# Patient Record
Sex: FEMALE | Race: WHITE | NOT HISPANIC OR LATINO | Employment: FULL TIME | URBAN - METROPOLITAN AREA
[De-identification: names, ages, dates, MRNs, and addresses within clinical notes are randomized per-mention and may not be internally consistent; named-entity substitution may affect disease eponyms.]

---

## 2020-02-09 ENCOUNTER — HOSPITAL ENCOUNTER (INPATIENT)
Facility: HOSPITAL | Age: 20
LOS: 3 days | Discharge: HOME | DRG: 871 | End: 2020-02-12
Attending: EMERGENCY MEDICINE | Admitting: INTERNAL MEDICINE
Payer: MEDICAID

## 2020-02-09 DIAGNOSIS — A41.9 SEPSIS: Primary | ICD-10-CM

## 2020-02-09 DIAGNOSIS — D50.0 IRON DEFICIENCY ANEMIA DUE TO CHRONIC BLOOD LOSS: ICD-10-CM

## 2020-02-09 DIAGNOSIS — R50.9 FEVER OF UNKNOWN ORIGIN (FUO): ICD-10-CM

## 2020-02-09 PROBLEM — B34.9 ACUTE VIRAL SYNDROME: Status: ACTIVE | Noted: 2020-02-09

## 2020-02-09 PROBLEM — E87.6 HYPOKALEMIA: Status: ACTIVE | Noted: 2020-02-09

## 2020-02-09 PROBLEM — I95.9 HYPOTENSION: Status: ACTIVE | Noted: 2020-02-09

## 2020-02-09 LAB
ALBUMIN SERPL BCP-MCNC: 3.8 G/DL (ref 3.5–5.2)
ALP SERPL-CCNC: 52 U/L (ref 55–135)
ALT SERPL W/O P-5'-P-CCNC: 13 U/L (ref 10–44)
ANION GAP SERPL CALC-SCNC: 11 MMOL/L (ref 8–16)
AST SERPL-CCNC: 18 U/L (ref 10–40)
BASOPHILS # BLD AUTO: 0.02 K/UL (ref 0–0.2)
BASOPHILS NFR BLD: 0.2 % (ref 0–1.9)
BILIRUB SERPL-MCNC: 0.6 MG/DL (ref 0.1–1)
BILIRUB UR QL STRIP: NEGATIVE
BUN SERPL-MCNC: 13 MG/DL (ref 6–20)
CALCIUM SERPL-MCNC: 9 MG/DL (ref 8.7–10.5)
CHLORIDE SERPL-SCNC: 108 MMOL/L (ref 95–110)
CLARITY UR: CLEAR
CO2 SERPL-SCNC: 22 MMOL/L (ref 23–29)
COLOR UR: YELLOW
CREAT SERPL-MCNC: 0.8 MG/DL (ref 0.5–1.4)
CRP SERPL-MCNC: 133.2 MG/L (ref 0–8.2)
DIFFERENTIAL METHOD: ABNORMAL
EOSINOPHIL # BLD AUTO: 0 K/UL (ref 0–0.5)
EOSINOPHIL NFR BLD: 0.3 % (ref 0–8)
ERYTHROCYTE [DISTWIDTH] IN BLOOD BY AUTOMATED COUNT: 15.7 % (ref 11.5–14.5)
EST. GFR  (AFRICAN AMERICAN): >60 ML/MIN/1.73 M^2
EST. GFR  (NON AFRICAN AMERICAN): >60 ML/MIN/1.73 M^2
FERRITIN SERPL-MCNC: 10 NG/ML (ref 20–300)
GLUCOSE SERPL-MCNC: 116 MG/DL (ref 70–110)
GLUCOSE UR QL STRIP: NEGATIVE
HCT VFR BLD AUTO: 32.7 % (ref 37–48.5)
HGB BLD-MCNC: 9.1 G/DL (ref 12–16)
HGB UR QL STRIP: ABNORMAL
HIV 1+2 AB+HIV1 P24 AG SERPL QL IA: NEGATIVE
IMM GRANULOCYTES # BLD AUTO: 0.04 K/UL (ref 0–0.04)
IMM GRANULOCYTES NFR BLD AUTO: 0.4 % (ref 0–0.5)
INFLUENZA A, MOLECULAR: NEGATIVE
INFLUENZA B, MOLECULAR: NEGATIVE
IRON SERPL-MCNC: 12 UG/DL (ref 30–160)
KETONES UR QL STRIP: NEGATIVE
LACTATE SERPL-SCNC: 1.4 MMOL/L (ref 0.5–2.2)
LEUKOCYTE ESTERASE UR QL STRIP: NEGATIVE
LYMPHOCYTES # BLD AUTO: 0.3 K/UL (ref 1–4.8)
LYMPHOCYTES NFR BLD: 2.4 % (ref 18–48)
MCH RBC QN AUTO: 20.8 PG (ref 27–31)
MCHC RBC AUTO-ENTMCNC: 27.8 G/DL (ref 32–36)
MCV RBC AUTO: 75 FL (ref 82–98)
MONOCYTES # BLD AUTO: 0.6 K/UL (ref 0.3–1)
MONOCYTES NFR BLD: 5.8 % (ref 4–15)
NEUTROPHILS # BLD AUTO: 9.4 K/UL (ref 1.8–7.7)
NEUTROPHILS NFR BLD: 90.9 % (ref 38–73)
NITRITE UR QL STRIP: NEGATIVE
NRBC BLD-RTO: 0 /100 WBC
PH UR STRIP: 6 [PH] (ref 5–8)
PLATELET # BLD AUTO: 302 K/UL (ref 150–350)
PMV BLD AUTO: 8.9 FL (ref 9.2–12.9)
POTASSIUM SERPL-SCNC: 3.4 MMOL/L (ref 3.5–5.1)
PROT SERPL-MCNC: 7.2 G/DL (ref 6–8.4)
PROT UR QL STRIP: NEGATIVE
RBC # BLD AUTO: 4.38 M/UL (ref 4–5.4)
SATURATED IRON: 3 % (ref 20–50)
SODIUM SERPL-SCNC: 141 MMOL/L (ref 136–145)
SP GR UR STRIP: 1.02 (ref 1–1.03)
SPECIMEN SOURCE: NORMAL
TOTAL IRON BINDING CAPACITY: 417 UG/DL (ref 250–450)
TRANSFERRIN SERPL-MCNC: 282 MG/DL (ref 200–375)
URN SPEC COLLECT METH UR: ABNORMAL
UROBILINOGEN UR STRIP-ACNC: NEGATIVE EU/DL
WBC # BLD AUTO: 10.3 K/UL (ref 3.9–12.7)

## 2020-02-09 PROCEDURE — 96375 TX/PRO/DX INJ NEW DRUG ADDON: CPT

## 2020-02-09 PROCEDURE — 63600175 PHARM REV CODE 636 W HCPCS: Performed by: NURSE PRACTITIONER

## 2020-02-09 PROCEDURE — 96366 THER/PROPH/DIAG IV INF ADDON: CPT

## 2020-02-09 PROCEDURE — 81003 URINALYSIS AUTO W/O SCOPE: CPT

## 2020-02-09 PROCEDURE — 63600175 PHARM REV CODE 636 W HCPCS: Performed by: FAMILY MEDICINE

## 2020-02-09 PROCEDURE — 63600175 PHARM REV CODE 636 W HCPCS: Performed by: INTERNAL MEDICINE

## 2020-02-09 PROCEDURE — 85025 COMPLETE CBC W/AUTO DIFF WBC: CPT

## 2020-02-09 PROCEDURE — G0378 HOSPITAL OBSERVATION PER HR: HCPCS

## 2020-02-09 PROCEDURE — 86140 C-REACTIVE PROTEIN: CPT

## 2020-02-09 PROCEDURE — 86703 HIV-1/HIV-2 1 RESULT ANTBDY: CPT

## 2020-02-09 PROCEDURE — 87040 BLOOD CULTURE FOR BACTERIA: CPT | Mod: 59

## 2020-02-09 PROCEDURE — 93005 ELECTROCARDIOGRAM TRACING: CPT

## 2020-02-09 PROCEDURE — 96365 THER/PROPH/DIAG IV INF INIT: CPT

## 2020-02-09 PROCEDURE — 80053 COMPREHEN METABOLIC PANEL: CPT

## 2020-02-09 PROCEDURE — 99291 CRITICAL CARE FIRST HOUR: CPT | Mod: 25

## 2020-02-09 PROCEDURE — 96367 TX/PROPH/DG ADDL SEQ IV INF: CPT

## 2020-02-09 PROCEDURE — 83540 ASSAY OF IRON: CPT

## 2020-02-09 PROCEDURE — 36415 COLL VENOUS BLD VENIPUNCTURE: CPT

## 2020-02-09 PROCEDURE — 87502 INFLUENZA DNA AMP PROBE: CPT

## 2020-02-09 PROCEDURE — 63600175 PHARM REV CODE 636 W HCPCS: Performed by: EMERGENCY MEDICINE

## 2020-02-09 PROCEDURE — 96361 HYDRATE IV INFUSION ADD-ON: CPT

## 2020-02-09 PROCEDURE — 96376 TX/PRO/DX INJ SAME DRUG ADON: CPT

## 2020-02-09 PROCEDURE — 25000003 PHARM REV CODE 250: Performed by: EMERGENCY MEDICINE

## 2020-02-09 PROCEDURE — 83605 ASSAY OF LACTIC ACID: CPT

## 2020-02-09 PROCEDURE — 21400001 HC TELEMETRY ROOM

## 2020-02-09 PROCEDURE — 82728 ASSAY OF FERRITIN: CPT

## 2020-02-09 PROCEDURE — 93010 ELECTROCARDIOGRAM REPORT: CPT | Mod: ,,, | Performed by: INTERNAL MEDICINE

## 2020-02-09 PROCEDURE — 93010 EKG 12-LEAD: ICD-10-PCS | Mod: ,,, | Performed by: INTERNAL MEDICINE

## 2020-02-09 RX ORDER — IPRATROPIUM BROMIDE AND ALBUTEROL SULFATE 2.5; .5 MG/3ML; MG/3ML
3 SOLUTION RESPIRATORY (INHALATION) EVERY 4 HOURS PRN
Status: DISCONTINUED | OUTPATIENT
Start: 2020-02-09 | End: 2020-02-12 | Stop reason: HOSPADM

## 2020-02-09 RX ORDER — ACETAMINOPHEN 325 MG/1
650 TABLET ORAL EVERY 6 HOURS PRN
Status: DISCONTINUED | OUTPATIENT
Start: 2020-02-09 | End: 2020-02-12 | Stop reason: HOSPADM

## 2020-02-09 RX ORDER — GUAIFENESIN 100 MG/5ML
200 SOLUTION ORAL EVERY 4 HOURS PRN
Status: DISCONTINUED | OUTPATIENT
Start: 2020-02-09 | End: 2020-02-12 | Stop reason: HOSPADM

## 2020-02-09 RX ORDER — FERROUS SULFATE 325(65) MG
325 TABLET, DELAYED RELEASE (ENTERIC COATED) ORAL 2 TIMES DAILY
Status: DISCONTINUED | OUTPATIENT
Start: 2020-02-09 | End: 2020-02-12 | Stop reason: HOSPADM

## 2020-02-09 RX ORDER — MAG HYDROX/ALUMINUM HYD/SIMETH 200-200-20
30 SUSPENSION, ORAL (FINAL DOSE FORM) ORAL EVERY 6 HOURS PRN
Status: DISCONTINUED | OUTPATIENT
Start: 2020-02-09 | End: 2020-02-12 | Stop reason: HOSPADM

## 2020-02-09 RX ORDER — SODIUM CHLORIDE AND POTASSIUM CHLORIDE 150; 900 MG/100ML; MG/100ML
INJECTION, SOLUTION INTRAVENOUS CONTINUOUS
Status: DISPENSED | OUTPATIENT
Start: 2020-02-09 | End: 2020-02-10

## 2020-02-09 RX ORDER — ACETAMINOPHEN 500 MG
1000 TABLET ORAL
Status: COMPLETED | OUTPATIENT
Start: 2020-02-09 | End: 2020-02-09

## 2020-02-09 RX ORDER — ONDANSETRON 2 MG/ML
4 INJECTION INTRAMUSCULAR; INTRAVENOUS EVERY 8 HOURS PRN
Status: DISCONTINUED | OUTPATIENT
Start: 2020-02-09 | End: 2020-02-12 | Stop reason: HOSPADM

## 2020-02-09 RX ORDER — KETOROLAC TROMETHAMINE 30 MG/ML
15 INJECTION, SOLUTION INTRAMUSCULAR; INTRAVENOUS
Status: COMPLETED | OUTPATIENT
Start: 2020-02-09 | End: 2020-02-09

## 2020-02-09 RX ORDER — VANCOMYCIN HCL IN 5 % DEXTROSE 1G/250ML
20 PLASTIC BAG, INJECTION (ML) INTRAVENOUS
Status: COMPLETED | OUTPATIENT
Start: 2020-02-09 | End: 2020-02-09

## 2020-02-09 RX ORDER — NORELGESTROMIN AND ETHINYL ESTRADIOL 35; 150 UG/MG; UG/MG
1 PATCH TRANSDERMAL WEEKLY
Status: ON HOLD | COMMUNITY
Start: 2019-01-28 | End: 2020-02-12 | Stop reason: SDUPTHER

## 2020-02-09 RX ORDER — DIPHENHYDRAMINE HCL 25 MG
25 CAPSULE ORAL EVERY 6 HOURS PRN
Status: DISCONTINUED | OUTPATIENT
Start: 2020-02-09 | End: 2020-02-12 | Stop reason: HOSPADM

## 2020-02-09 RX ADMIN — PIPERACILLIN AND TAZOBACTAM 4.5 G: 4; .5 INJECTION, POWDER, LYOPHILIZED, FOR SOLUTION INTRAVENOUS; PARENTERAL at 03:02

## 2020-02-09 RX ADMIN — VANCOMYCIN HYDROCHLORIDE 1000 MG: 1 INJECTION, POWDER, LYOPHILIZED, FOR SOLUTION INTRAVENOUS at 05:02

## 2020-02-09 RX ADMIN — SODIUM CHLORIDE, SODIUM LACTATE, POTASSIUM CHLORIDE, AND CALCIUM CHLORIDE 1000 ML: 600; 310; 30; 20 INJECTION, SOLUTION INTRAVENOUS at 04:02

## 2020-02-09 RX ADMIN — VANCOMYCIN HYDROCHLORIDE 500 MG: 500 INJECTION, POWDER, LYOPHILIZED, FOR SOLUTION INTRAVENOUS at 05:02

## 2020-02-09 RX ADMIN — IRON SUCROSE 200 MG: 20 INJECTION, SOLUTION INTRAVENOUS at 05:02

## 2020-02-09 RX ADMIN — ACETAMINOPHEN 1000 MG: 500 TABLET ORAL at 03:02

## 2020-02-09 RX ADMIN — PIPERACILLIN AND TAZOBACTAM 4.5 G: 4; .5 INJECTION, POWDER, LYOPHILIZED, FOR SOLUTION INTRAVENOUS; PARENTERAL at 01:02

## 2020-02-09 RX ADMIN — PIPERACILLIN AND TAZOBACTAM 4.5 G: 4; .5 INJECTION, POWDER, LYOPHILIZED, FOR SOLUTION INTRAVENOUS; PARENTERAL at 08:02

## 2020-02-09 RX ADMIN — SODIUM CHLORIDE AND POTASSIUM CHLORIDE: 9; 1.49 INJECTION, SOLUTION INTRAVENOUS at 06:02

## 2020-02-09 RX ADMIN — CEFTRIAXONE 2 G: 2 INJECTION, SOLUTION INTRAVENOUS at 10:02

## 2020-02-09 RX ADMIN — KETOROLAC TROMETHAMINE 15 MG: 30 INJECTION, SOLUTION INTRAMUSCULAR at 03:02

## 2020-02-09 RX ADMIN — ONDANSETRON 4 MG: 2 INJECTION INTRAMUSCULAR; INTRAVENOUS at 03:02

## 2020-02-09 RX ADMIN — SODIUM CHLORIDE 1413 ML: 0.9 INJECTION, SOLUTION INTRAVENOUS at 03:02

## 2020-02-09 NOTE — H&P
"[facility] Medicine  History & Physical    Patient Name: Danielle Brown  MRN: [id number]  Admission Date: 2/9/2020  Attending Physician: Williams Sagastume MD  Primary Care Provider: Primary Doctor No         Patient information was obtained from patient, past medical records and ER records.     Subjective:     Principal Problem:Sepsis    Chief Complaint:   Chief Complaint   Patient presents with    General Illness     pt c/o dizziness, N/V/D, fever and SOB. States "i felt like this last time i was septic"         HPI: Ms. Brown is a young 19-year-old [place] female, currently a student in nursing, with history of septic episode in October 2018, presented to the ED complaining of fever, nausea, vomiting, diarrhea since early yesterday morning.  She states that her mother who just arrived from [place] yesterday has been having cold, cough symptoms and patient immediately started having similar symptoms.  Patient did not have the flu shot this year, but she tested negative for influenza in the ED.  Fever 102.9, , WBC 70989, 0% bands, lactic acid 1.4.  Urinalysis unremarkable.  Chest x-ray without infiltrates, masses or effusions.  No clear source of infection however patient reports that due to heavy menstrual periods she overuses tampons frequently.  But denies lower abdominal discomfort.  She just removed the tampon in the ED after 12 hr.  Patient has not made an appointment to see an OBGYN yet for heavy menstrual periods.  Per ED physician, patient looked toxic upon initial presentation to the ED, but after 2 L of IV fluids she is less toxic appearing. There was a concern of tampon induced staphylococcal toxic shock syndrome and hence she was started on IV vancomycin, along with IV Zosyn.    Admit diagnosis sepsis likely secondary to acute viral syndrome versus others.    Past Medical History:   Diagnosis Date    Sepsis        History reviewed. No pertinent surgical history.    Review " of patient's allergies indicates:   Allergen Reactions    Sulfamethoxazole-trimethoprim Swelling     To lips  Other reaction(s): lip swelling      Sulfa (sulfonamide antibiotics)        No current facility-administered medications on file prior to encounter.      Current Outpatient Medications on File Prior to Encounter   Medication Sig    norelgestromin-ethinyl estradiol (ORTHO EVRA) 150-35 mcg/24 hr Place 1 patch onto the skin once a week.    [DISCONTINUED] naproxen (NAPROSYN) 375 MG tablet Take 1 tablet (375 mg total) by mouth 2 (two) times daily with meals.     Family History     Problem Relation (Age of Onset)    Spinal muscular atrophy Brother        Tobacco Use    Smoking status: Never Smoker    Smokeless tobacco: Never Used   Substance and Sexual Activity    Alcohol use: Never     Frequency: Never    Drug use: Never    Sexual activity: Not Currently     Review of Systems   Constitutional: Positive for chills, diaphoresis, fatigue and fever.   HENT: Negative.  Negative for congestion, nosebleeds and sinus pressure.    Eyes: Negative.  Negative for visual disturbance.   Respiratory: Negative.  Negative for cough, chest tightness, shortness of breath and wheezing.    Cardiovascular: Negative.  Negative for chest pain, palpitations and leg swelling.   Gastrointestinal: Positive for diarrhea (2-3 episodes of watery, nonbloody), nausea and vomiting. Negative for abdominal pain.   Endocrine: Negative.  Negative for polyuria.   Genitourinary: Negative.  Negative for dysuria, flank pain, frequency and urgency.   Musculoskeletal: Negative.  Negative for back pain, joint swelling and neck stiffness.   Skin: Negative.  Negative for color change, pallor and rash.   Allergic/Immunologic: Negative.  Negative for immunocompromised state.   Neurological: Positive for dizziness. Negative for syncope, speech difficulty, numbness and headaches.   Hematological: Negative.  Negative for adenopathy. Does not bruise/bleed  easily.   Psychiatric/Behavioral: Negative.  Negative for confusion, decreased concentration and hallucinations. The patient is not nervous/anxious.    All other systems reviewed and are negative.    Objective:     Vital Signs (Most Recent):  Temp: 100.3 °F (37.9 °C) (02/09/20 0521)  Pulse: 104 (02/09/20 0521)  Resp: 19 (02/09/20 0521)  BP: (!) 92/55 (02/09/20 0521)  SpO2: 100 % (02/09/20 0521) Vital Signs (24h Range):  Temp:  [100.3 °F (37.9 °C)-102.9 °F (39.4 °C)] 100.3 °F (37.9 °C)  Pulse:  [104-130] 104  Resp:  [16-20] 19  SpO2:  [100 %] 100 %  BP: ()/(43-58) 92/55     Weight: 47.1 kg (103 lb 14.4 oz)  Body mass index is 19 kg/m².    Physical Exam   Constitutional: She is oriented to person, place, and time. No distress.   Appears uncomfortable, sickly.  But in no respiratory distress. No family at the bedside.   HENT:   Head: Normocephalic and atraumatic.   Eyes: Conjunctivae and EOM are normal. No scleral icterus.   Neck: Normal range of motion. Neck supple. No tracheal deviation present. No thyromegaly present.   Cardiovascular: Regular rhythm, normal heart sounds and intact distal pulses. Tachycardia present.   No murmur heard.  Pulmonary/Chest: Effort normal and breath sounds normal. No respiratory distress. She has no wheezes. She has no rales. She exhibits no tenderness.   Abdominal: Soft. Bowel sounds are normal. She exhibits no distension. There is no tenderness.   Musculoskeletal: Normal range of motion. She exhibits no edema or tenderness.   Neurological: She is alert and oriented to person, place, and time. No cranial nerve deficit. She exhibits normal muscle tone. Coordination normal.   Skin: Skin is warm and dry. She is not diaphoretic. No erythema.   Psychiatric: She has a normal mood and affect. Her behavior is normal. Judgment and thought content normal.   Nursing note and vitals reviewed.        CRANIAL NERVES     CN III, IV, VI   Extraocular motions are normal.        Significant Labs:    Bilirubin:   Recent Labs   Lab 02/09/20  0259   BILITOT 0.6     Blood Culture: No results for input(s): LABBLOO in the last 48 hours.  BMP:   Recent Labs   Lab 02/09/20  0259   *      K 3.4*      CO2 22*   BUN 13   CREATININE 0.8   CALCIUM 9.0     CBC:   Recent Labs   Lab 02/09/20  0259   WBC 10.30   HGB 9.1*   HCT 32.7*        CMP:   Recent Labs   Lab 02/09/20  0259      K 3.4*      CO2 22*   *   BUN 13   CREATININE 0.8   CALCIUM 9.0   PROT 7.2   ALBUMIN 3.8   BILITOT 0.6   ALKPHOS 52*   AST 18   ALT 13   ANIONGAP 11   EGFRNONAA >60     Cardiac Markers: No results for input(s): CKMB, MYOGLOBIN, BNP, TROPISTAT in the last 48 hours.  Coagulation: No results for input(s): PT, INR, APTT in the last 48 hours.  Lactic Acid:   Recent Labs   Lab 02/09/20  0259   LACTATE 1.4     Troponin: No results for input(s): TROPONINI in the last 48 hours.  Urine Studies:   Recent Labs   Lab 02/09/20  0412   COLORU Yellow   APPEARANCEUA Clear   PHUR 6.0   SPECGRAV 1.020   PROTEINUA Negative   GLUCUA Negative   KETONESU Negative   BILIRUBINUA Negative   OCCULTUA Trace*   NITRITE Negative   UROBILINOGEN Negative   LEUKOCYTESUR Negative     All pertinent labs within the past 24 hours have been reviewed.    Significant Imaging: I have reviewed and interpreted all pertinent imaging results/findings within the past 24 hours.     Imaging Results          X-Ray Chest AP Portable (In process)                 I have independently reviewed all pertinent labs within the past 24 hours.    I have independently reviewed, visualized and interpreted all pertinent imaging results within the past 24 hours and discussed the findings with the ED physician, Dr. Berrios            Assessment/Plan:     * Sepsis  Fever 102.9, , WBC 70040, 0% bands, lactic acid 1.4.  Unclear etiology of patient's infection, but strongly suspect acute viral syndrome.  ED physician was concerned about tampon induced  staphylococcal toxic shock syndrome, though unlikely/rare.  On IV vancomycin, IV Zosyn empirically.  Deescalate antibiotics as soon as feasible.  Follow up on cultures.  Continue normal saline 125 cc with potassium supplementation.  She received 2 L normal saline boluses in the ED.  Patient concerned about recurrent sepsis requiring hospitalization in the past.  Requesting for ID consult/evaluation (??  Immunodeficiency syndrome at baseline??  HIV negative.        Hypotension  Latest blood pressure 92/55, in the setting of sepsis.  Lactic acid 1.4  Young female, continue aggressive IV fluid hydration.  Unclear etiology of patient's infection.      Acute viral syndrome  Negative for influenza and HIV  But patient's mother had cough, congestion (URI type symptoms)  Likely etiology for patient sepsis.  Continue supportive care  Bronchodilators, IV fluids, antitussives      Hypokalemia  Potassium 3.2.  Labs in a.m4.  Replace with NS with 20 mEq KCL at 125 cc/hour.  Repeat        VTE Risk Mitigation (From admission, onward)         Ordered     Place sequential compression device  Until discontinued      02/09/20 2170                   Williams Sagastume MD  Department of Hospital Medicine   [facility]

## 2020-02-09 NOTE — SUBJECTIVE & OBJECTIVE
Past Medical History:   Diagnosis Date    Sepsis        History reviewed. No pertinent surgical history.    Review of patient's allergies indicates:   Allergen Reactions    Sulfamethoxazole-trimethoprim Swelling     To lips  Other reaction(s): lip swelling      Sulfa (sulfonamide antibiotics)        No current facility-administered medications on file prior to encounter.      Current Outpatient Medications on File Prior to Encounter   Medication Sig    norelgestromin-ethinyl estradiol (ORTHO EVRA) 150-35 mcg/24 hr Place 1 patch onto the skin once a week.    [DISCONTINUED] naproxen (NAPROSYN) 375 MG tablet Take 1 tablet (375 mg total) by mouth 2 (two) times daily with meals.     Family History     Problem Relation (Age of Onset)    Spinal muscular atrophy Brother        Tobacco Use    Smoking status: Never Smoker    Smokeless tobacco: Never Used   Substance and Sexual Activity    Alcohol use: Never     Frequency: Never    Drug use: Never    Sexual activity: Not Currently     Review of Systems   Constitutional: Positive for chills, diaphoresis, fatigue and fever.   HENT: Negative.  Negative for congestion, nosebleeds and sinus pressure.    Eyes: Negative.  Negative for visual disturbance.   Respiratory: Negative.  Negative for cough, chest tightness, shortness of breath and wheezing.    Cardiovascular: Negative.  Negative for chest pain, palpitations and leg swelling.   Gastrointestinal: Positive for diarrhea (2-3 episodes of watery, nonbloody), nausea and vomiting. Negative for abdominal pain.   Endocrine: Negative.  Negative for polyuria.   Genitourinary: Negative.  Negative for dysuria, flank pain, frequency and urgency.   Musculoskeletal: Negative.  Negative for back pain, joint swelling and neck stiffness.   Skin: Negative.  Negative for color change, pallor and rash.   Allergic/Immunologic: Negative.  Negative for immunocompromised state.   Neurological: Positive for dizziness. Negative for syncope,  speech difficulty, numbness and headaches.   Hematological: Negative.  Negative for adenopathy. Does not bruise/bleed easily.   Psychiatric/Behavioral: Negative.  Negative for confusion, decreased concentration and hallucinations. The patient is not nervous/anxious.    All other systems reviewed and are negative.    Objective:     Vital Signs (Most Recent):  Temp: 100.3 °F (37.9 °C) (02/09/20 0521)  Pulse: 104 (02/09/20 0521)  Resp: 19 (02/09/20 0521)  BP: (!) 92/55 (02/09/20 0521)  SpO2: 100 % (02/09/20 0521) Vital Signs (24h Range):  Temp:  [100.3 °F (37.9 °C)-102.9 °F (39.4 °C)] 100.3 °F (37.9 °C)  Pulse:  [104-130] 104  Resp:  [16-20] 19  SpO2:  [100 %] 100 %  BP: ()/(43-58) 92/55     Weight: 47.1 kg (103 lb 14.4 oz)  Body mass index is 19 kg/m².    Physical Exam   Constitutional: She is oriented to person, place, and time. No distress.   Appears uncomfortable, sickly.  But in no respiratory distress. No family at the bedside.   HENT:   Head: Normocephalic and atraumatic.   Eyes: Conjunctivae and EOM are normal. No scleral icterus.   Neck: Normal range of motion. Neck supple. No tracheal deviation present. No thyromegaly present.   Cardiovascular: Regular rhythm, normal heart sounds and intact distal pulses. Tachycardia present.   No murmur heard.  Pulmonary/Chest: Effort normal and breath sounds normal. No respiratory distress. She has no wheezes. She has no rales. She exhibits no tenderness.   Abdominal: Soft. Bowel sounds are normal. She exhibits no distension. There is no tenderness.   Musculoskeletal: Normal range of motion. She exhibits no edema or tenderness.   Neurological: She is alert and oriented to person, place, and time. No cranial nerve deficit. She exhibits normal muscle tone. Coordination normal.   Skin: Skin is warm and dry. She is not diaphoretic. No erythema.   Psychiatric: She has a normal mood and affect. Her behavior is normal. Judgment and thought content normal.   Nursing note and  vitals reviewed.        CRANIAL NERVES     CN III, IV, VI   Extraocular motions are normal.        Significant Labs:   Bilirubin:   Recent Labs   Lab 02/09/20  0259   BILITOT 0.6     Blood Culture: No results for input(s): LABBLOO in the last 48 hours.  BMP:   Recent Labs   Lab 02/09/20 0259   *      K 3.4*      CO2 22*   BUN 13   CREATININE 0.8   CALCIUM 9.0     CBC:   Recent Labs   Lab 02/09/20 0259   WBC 10.30   HGB 9.1*   HCT 32.7*        CMP:   Recent Labs   Lab 02/09/20 0259      K 3.4*      CO2 22*   *   BUN 13   CREATININE 0.8   CALCIUM 9.0   PROT 7.2   ALBUMIN 3.8   BILITOT 0.6   ALKPHOS 52*   AST 18   ALT 13   ANIONGAP 11   EGFRNONAA >60     Cardiac Markers: No results for input(s): CKMB, MYOGLOBIN, BNP, TROPISTAT in the last 48 hours.  Coagulation: No results for input(s): PT, INR, APTT in the last 48 hours.  Lactic Acid:   Recent Labs   Lab 02/09/20 0259   LACTATE 1.4     Troponin: No results for input(s): TROPONINI in the last 48 hours.  Urine Studies:   Recent Labs   Lab 02/09/20  0412   COLORU Yellow   APPEARANCEUA Clear   PHUR 6.0   SPECGRAV 1.020   PROTEINUA Negative   GLUCUA Negative   KETONESU Negative   BILIRUBINUA Negative   OCCULTUA Trace*   NITRITE Negative   UROBILINOGEN Negative   LEUKOCYTESUR Negative     All pertinent labs within the past 24 hours have been reviewed.    Significant Imaging: I have reviewed and interpreted all pertinent imaging results/findings within the past 24 hours.     Imaging Results          X-Ray Chest AP Portable (In process)                 I have independently reviewed all pertinent labs within the past 24 hours.    I have independently reviewed, visualized and interpreted all pertinent imaging results within the past 24 hours and discussed the findings with the ED physician, Dr. Berrios

## 2020-02-09 NOTE — PROGRESS NOTES
Pharmacokinetic Initial Assessment: IV Vancomycin    Assessment/Plan:    Initiate intravenous vancomycin with loading dose of 1000 mg once followed by a maintenance dose of vancomycin 500mg IV every 12 hours  Desired empiric serum trough concentration is 10 to 20 mcg/mL  Draw vancomycin trough level 30 min prior to fourth dose on 2/10 at approximately 1730  Pharmacy will continue to follow and monitor vancomycin.      Please contact pharmacy at extension [phone] with any questions regarding this assessment.     Thank you for the consult,   Stacia March       Patient brief summary:  Danielle Brown is a 19 y.o. female initiated on antimicrobial therapy with IV Vancomycin for treatment of suspected sepsis    Drug Allergies:   Review of patient's allergies indicates:   Allergen Reactions    Sulfamethoxazole-trimethoprim Swelling     To lips  Other reaction(s): lip swelling      Sulfa (sulfonamide antibiotics)        Actual Body Weight:   47.1kg    Renal Function:   Estimated Creatinine Clearance: 84.1 mL/min (based on SCr of 0.8 mg/dL).,     Dialysis Method (if applicable):  N/A    CBC (last 72 hours):  Recent Labs   Lab Result Units 02/09/20  0259   WBC K/uL 10.30   Hemoglobin g/dL 9.1*   Hematocrit % 32.7*   Platelets K/uL 302   Gran% % 90.9*   Lymph% % 2.4*   Mono% % 5.8   Eosinophil% % 0.3   Basophil% % 0.2   Differential Method  Automated       Metabolic Panel (last 72 hours):  Recent Labs   Lab Result Units 02/09/20  0259 02/09/20  0412   Sodium mmol/L 141  --    Potassium mmol/L 3.4*  --    Chloride mmol/L 108  --    CO2 mmol/L 22*  --    Glucose mg/dL 116*  --    Glucose, UA   --  Negative   BUN, Bld mg/dL 13  --    Creatinine mg/dL 0.8  --    Albumin g/dL 3.8  --    Total Bilirubin mg/dL 0.6  --    Alkaline Phosphatase U/L 52*  --    AST U/L 18  --    ALT U/L 13  --        Drug levels (last 3 results):  No results for input(s): VANCOMYCINRA, VANCOMYCINPE, VANCOMYCINTR in the last 72 hours.    Microbiologic  Results:  Microbiology Results (last 7 days)     Procedure Component Value Units Date/Time    Blood culture x two cultures. Draw prior to antibiotics. [[id number]] Collected:  02/09/20 0316    Order Status:  Sent Specimen:  Blood from Peripheral, Antecubital, Right Updated:  02/09/20 0552    Influenza A & B by Molecular [[id number]] Collected:  02/09/20 0316    Order Status:  Completed Specimen:  Nasopharyngeal Swab Updated:  02/09/20 0357     Influenza A, Molecular Negative     Influenza B, Molecular Negative     Flu A & B Source Nasal swab    Blood culture x two cultures. Draw prior to antibiotics. [[id number]] Collected:  02/09/20 0255    Order Status:  Sent Specimen:  Blood from Peripheral, Antecubital, Right Updated:  02/09/20 0304

## 2020-02-09 NOTE — ASSESSMENT & PLAN NOTE
Fever 102.9, , WBC 11269, 0% bands, lactic acid 1.4.  Unclear etiology of patient's infection, but strongly suspect acute viral syndrome.  ED physician was concerned about tampon induced staphylococcal toxic shock syndrome, though unlikely/rare.  On IV vancomycin, IV Zosyn empirically.  Deescalate antibiotics as soon as feasible.  Follow up on cultures.  Continue normal saline 125 cc with potassium supplementation.  She received 2 L normal saline boluses in the ED.  Patient concerned about recurrent sepsis requiring hospitalization in the past.  Requesting for ID consult/evaluation (??  Immunodeficiency syndrome at baseline??  HIV negative.

## 2020-02-09 NOTE — ED NOTES
Dr. Fink, [facility] medicine, at bedside for pt evaluation.  Dr. Fink notified of pt's report of irregular vaginal bleeding and prolonged tampon use.  No new orders at this time.

## 2020-02-09 NOTE — ED PROVIDER NOTES
"  History of Present Illness: Danielle Brown is a 19 y.o. female patient presenting for fever, dizziness, n/v/d    2:08 AM: Pt was placed back in [facility]. I explained to pt that due to lack of available rooms pt was seen by myself to begin the workup. Pt understands they will be seen and dispositioned by the next available physician. Pt voices understanding and agrees with plan. Pt also understands the longer than normal wait time. I am removing myself from the care of pt and pt will now be assigned to the next available physician.       SCRIBE #1 NOTE: I, Wilbert Florez, am scribing for, and in the presence of, Pedro Berrios MD. I have scribed the entire note.       History     Chief Complaint   Patient presents with    General Illness     pt c/o dizziness, N/V/D, fever and SOB. States "i felt like this last time i was septic"      Review of patient's allergies indicates:   Allergen Reactions    Sulfamethoxazole-trimethoprim Swelling     To lips  Other reaction(s): lip swelling      Sulfa (sulfonamide antibiotics)          History of Present Illness     HPI    2/9/2020, 3:16 AM  History obtained from the patient      History of Present Illness: Danielle Brown is a 19 y.o. female patient with a PMHx of sepsis who presents to the Emergency Department for evaluation of general illness which onset gradually this evening. Pt reports picking up her mother from the airport, who was flying in from [place] and was sick. Pt states "I felt like this last time I was septic" and remarked that about a year ago she was admitted and treated for sepsis. Pt states she hasn't yet f/u with an infectious disease specialist due to being very busy. Pt uses tampons and states that she's had several occasions of having one in for more than 8-10 hours, and has been using more than usual due to bleeding every 2 weeks or so. Symptoms are constant and moderate in severity. No mitigating or exacerbating factors reported. Associated sxs " include dizziness, N/V/D, fever, chills, diaphoresis, generalized myalgias, HA, lower back pain, and SOB. Patient denies any cough, hematochezia, abdominal pain, neck stiffness, syncope, and all other sxs at this time. No prior Tx reported. No further complaints or concerns at this time.       Arrival mode: EMS    PCP: Primary Doctor No        Past Medical History:  Sepsis    Past Surgical History:  History reviewed. No pertinent surgical history.    Family History:  History reviewed. No pertinent family history.    Social History:  Social History Main Topics    Smoking status: Unknown if ever smoked    Smokeless tobacco: Unknown if ever used    Alcohol Use: Unknown drinking history    Drug Use: Unknown if ever used    Sexual Activity: Unknown        Review of Systems     Review of Systems   Constitutional: Positive for chills, diaphoresis and fever.   HENT: Negative for sore throat.    Respiratory: Positive for shortness of breath. Negative for cough.    Cardiovascular: Negative for chest pain and leg swelling.   Gastrointestinal: Positive for diarrhea, nausea and vomiting. Negative for abdominal pain and blood in stool.   Genitourinary: Positive for pelvic pain (Period cramps; every 2 weeks) and vaginal bleeding (Every 2 weeks). Negative for dysuria.   Musculoskeletal: Positive for back pain (Lower) and myalgias (Generalized). Negative for neck pain and neck stiffness.   Skin: Negative for rash and wound.   Neurological: Positive for dizziness and headaches. Negative for syncope, weakness, light-headedness and numbness.   Hematological: Does not bruise/bleed easily.   All other systems reviewed and are negative.     Physical Exam     Initial Vitals   BP Pulse Resp Temp SpO2   02/09/20 0204 02/09/20 0204 02/09/20 0204 02/09/20 0204 02/09/20 0230   (!) 103/53 (!) 130 20 (!) 102.9 °F (39.4 °C) 100 %      MAP       --                 Physical Exam  Nursing Notes and Vital Signs Reviewed.  Constitutional: Patient  is in no acute distress. Diaphoretic and toxic-appearing.  Head: Atraumatic. Normocephalic.  Eyes: PERRL. EOM intact. Conjunctivae are not pale. No scleral icterus.  ENT: Mucous membranes are moist. Oropharynx is clear and symmetric.    Neck: Supple. Full ROM. No lymphadenopathy.  Cardiovascular: Tachycardic. Regular rhythm. No murmurs, rubs, or gallops. Distal pulses are 2+ and symmetric.  Pulmonary/Chest: No respiratory distress. Clear to auscultation bilaterally. No wheezing or rales.  Abdominal: Soft and non-distended.  There is no tenderness.  No rebound, guarding, or rigidity. Good bowel sounds.  Genitourinary: There is right CVA tenderness.  Musculoskeletal: Moves all extremities. No obvious deformities. No edema. No calf tenderness.  Skin: Warm to touch.  Neurological:  Alert, awake, and appropriate.  Normal speech.  No acute focal neurological deficits are appreciated.  Psychiatric: Normal affect. Good eye contact. Appropriate in content.     ED Course   Critical Care  Date/Time: 2/9/2020 3:43 AM  Performed by: Pedro Berrios MD  Authorized by: Pedro Berrios MD   Direct patient critical care time: 15 minutes  Additional history critical care time: 5 minutes  Ordering / reviewing critical care time: 10 minutes  Documentation critical care time: 5 minutes  Consulting other physicians critical care time: 5 minutes  Total critical care time (exclusive of procedural time) : 40 minutes  Critical care time was exclusive of separately billable procedures and treating other patients and teaching time.  Critical care was necessary to treat or prevent imminent or life-threatening deterioration of the following conditions: sepsis.  Critical care was time spent personally by me on the following activities: blood draw for specimens, development of treatment plan with patient or surrogate, discussions with consultants, interpretation of cardiac output measurements, evaluation of patient's response to treatment,  examination of patient, obtaining history from patient or surrogate, ordering and performing treatments and interventions, ordering and review of laboratory studies, ordering and review of radiographic studies, pulse oximetry, re-evaluation of patient's condition and review of old charts.        ED Vital Signs:  Vitals:    02/10/20 0600 02/10/20 0721 02/10/20 0901 02/10/20 1101   BP:  (!) 102/54     Pulse:  103 (!) 111 105   Resp:  17     Temp:  98.9 °F (37.2 °C)     TempSrc:  Oral     SpO2:  99%     Weight: 47.8 kg (105 lb 6.1 oz)      Height:        02/10/20 1113 02/10/20 1230 02/10/20 1245 02/10/20 1300   BP: 102/61 (!) 105/52 (!) 116/59 105/60   Pulse: 97 106 105 101   Resp: 20 20 20 20   Temp: 98.2 °F (36.8 °C) 98.2 °F (36.8 °C) 98.1 °F (36.7 °C) 98.2 °F (36.8 °C)   TempSrc: Oral Oral Oral Oral   SpO2: 100% 99% 100% 100%   Weight:       Height:        02/10/20 1301 02/10/20 1501 02/10/20 1522 02/10/20 1610   BP:   (!) 104/53 (!) 110/56   Pulse: 98 93 100 97   Resp:   20 18   Temp:   98.1 °F (36.7 °C) 98.2 °F (36.8 °C)   TempSrc:   Oral Oral   SpO2:   99% 99%   Weight:       Height:        02/10/20 1625 02/10/20 1701 02/10/20 1924   BP: (!) 107/57  121/81   Pulse: 94 94 81   Resp: 18  18   Temp: 98.1 °F (36.7 °C)     TempSrc: Oral     SpO2: 100%     Weight:      Height:          Abnormal Lab Results:  Labs Reviewed   CBC W/ AUTO DIFFERENTIAL - Abnormal; Notable for the following components:       Result Value    Hemoglobin 9.1 (*)     Hematocrit 32.7 (*)     Mean Corpuscular Volume 75 (*)     Mean Corpuscular Hemoglobin 20.8 (*)     Mean Corpuscular Hemoglobin Conc 27.8 (*)     RDW 15.7 (*)     MPV 8.9 (*)     Gran # (ANC) 9.4 (*)     Lymph # 0.3 (*)     Gran% 90.9 (*)     Lymph% 2.4 (*)     All other components within normal limits   COMPREHENSIVE METABOLIC PANEL - Abnormal; Notable for the following components:    Potassium 3.4 (*)     CO2 22 (*)     Glucose 116 (*)     Alkaline Phosphatase 52 (*)     All  other components within normal limits   URINALYSIS, REFLEX TO URINE CULTURE - Abnormal; Notable for the following components:    Occult Blood UA Trace (*)     All other components within normal limits    Narrative:     Preferred Collection Type->Urine, Clean Catch   INFLUENZA A & B BY MOLECULAR   LACTIC ACID, PLASMA   HIV 1 / 2 ANTIBODY        All Lab Results:  Results for orders placed or performed during the hospital encounter of 02/09/20   Blood culture x two cultures. Draw prior to antibiotics.   Result Value Ref Range    Blood Culture, Routine No Growth to date     Blood Culture, Routine No Growth to date    Blood culture x two cultures. Draw prior to antibiotics.   Result Value Ref Range    Blood Culture, Routine No Growth to date     Blood Culture, Routine No Growth to date    Influenza A & B by Molecular   Result Value Ref Range    Influenza A, Molecular Negative Negative    Influenza B, Molecular Negative Negative    Flu A & B Source Nasal swab    CBC auto differential   Result Value Ref Range    WBC 10.30 3.90 - 12.70 K/uL    RBC 4.38 4.00 - 5.40 M/uL    Hemoglobin 9.1 (L) 12.0 - 16.0 g/dL    Hematocrit 32.7 (L) 37.0 - 48.5 %    Mean Corpuscular Volume 75 (L) 82 - 98 fL    Mean Corpuscular Hemoglobin 20.8 (L) 27.0 - 31.0 pg    Mean Corpuscular Hemoglobin Conc 27.8 (L) 32.0 - 36.0 g/dL    RDW 15.7 (H) 11.5 - 14.5 %    Platelets 302 150 - 350 K/uL    MPV 8.9 (L) 9.2 - 12.9 fL    Immature Granulocytes 0.4 0.0 - 0.5 %    Gran # (ANC) 9.4 (H) 1.8 - 7.7 K/uL    Immature Grans (Abs) 0.04 0.00 - 0.04 K/uL    Lymph # 0.3 (L) 1.0 - 4.8 K/uL    Mono # 0.6 0.3 - 1.0 K/uL    Eos # 0.0 0.0 - 0.5 K/uL    Baso # 0.02 0.00 - 0.20 K/uL    nRBC 0 0 /100 WBC    Gran% 90.9 (H) 38.0 - 73.0 %    Lymph% 2.4 (L) 18.0 - 48.0 %    Mono% 5.8 4.0 - 15.0 %    Eosinophil% 0.3 0.0 - 8.0 %    Basophil% 0.2 0.0 - 1.9 %    Differential Method Automated    Comprehensive metabolic panel   Result Value Ref Range    Sodium 141 136 - 145 mmol/L     Potassium 3.4 (L) 3.5 - 5.1 mmol/L    Chloride 108 95 - 110 mmol/L    CO2 22 (L) 23 - 29 mmol/L    Glucose 116 (H) 70 - 110 mg/dL    BUN, Bld 13 6 - 20 mg/dL    Creatinine 0.8 0.5 - 1.4 mg/dL    Calcium 9.0 8.7 - 10.5 mg/dL    Total Protein 7.2 6.0 - 8.4 g/dL    Albumin 3.8 3.5 - 5.2 g/dL    Total Bilirubin 0.6 0.1 - 1.0 mg/dL    Alkaline Phosphatase 52 (L) 55 - 135 U/L    AST 18 10 - 40 U/L    ALT 13 10 - 44 U/L    Anion Gap 11 8 - 16 mmol/L    eGFR if African American >60 >60 mL/min/1.73 m^2    eGFR if non African American >60 >60 mL/min/1.73 m^2   Lactic acid, plasma #1   Result Value Ref Range    Lactate (Lactic Acid) 1.4 0.5 - 2.2 mmol/L   Urinalysis, Reflex to Urine Culture Urine, Clean Catch   Result Value Ref Range    Specimen UA Urine, Clean Catch     Color, UA Yellow Yellow, Straw, Conchita    Appearance, UA Clear Clear    pH, UA 6.0 5.0 - 8.0    Specific Gravity, UA 1.020 1.005 - 1.030    Protein, UA Negative Negative    Glucose, UA Negative Negative    Ketones, UA Negative Negative    Bilirubin (UA) Negative Negative    Occult Blood UA Trace (A) Negative    Nitrite, UA Negative Negative    Urobilinogen, UA Negative <2.0 EU/dL    Leukocytes, UA Negative Negative   HIV 1/2 Ag/Ab (4th Gen)   Result Value Ref Range    HIV 1/2 Ag/Ab Negative Negative   Iron and TIBC   Result Value Ref Range    Iron 12 (L) 30 - 160 ug/dL    Transferrin 282 200 - 375 mg/dL    TIBC 417 250 - 450 ug/dL    Saturated Iron 3 (L) 20 - 50 %   Ferritin   Result Value Ref Range    Ferritin 10 (L) 20.0 - 300.0 ng/mL   C-reactive protein   Result Value Ref Range    .2 (H) 0.0 - 8.2 mg/L   Magnesium   Result Value Ref Range    Magnesium 1.4 (L) 1.6 - 2.6 mg/dL   Phosphorus   Result Value Ref Range    Phosphorus 2.2 (L) 2.7 - 4.5 mg/dL   CBC auto differential   Result Value Ref Range    WBC 13.48 (H) 3.90 - 12.70 K/uL    RBC 3.30 (L) 4.00 - 5.40 M/uL    Hemoglobin 6.9 (L) 12.0 - 16.0 g/dL    Hematocrit 27.4 (L) 37.0 - 48.5 %    Mean  Corpuscular Volume 83 82 - 98 fL    Mean Corpuscular Hemoglobin 20.9 (L) 27.0 - 31.0 pg    Mean Corpuscular Hemoglobin Conc 25.2 (L) 32.0 - 36.0 g/dL    RDW 16.2 (H) 11.5 - 14.5 %    Platelets 239 150 - 350 K/uL    MPV 8.9 (L) 9.2 - 12.9 fL    Immature Granulocytes 0.6 (H) 0.0 - 0.5 %    Gran # (ANC) 11.8 (H) 1.8 - 7.7 K/uL    Immature Grans (Abs) 0.08 (H) 0.00 - 0.04 K/uL    Lymph # 0.8 (L) 1.0 - 4.8 K/uL    Mono # 0.7 0.3 - 1.0 K/uL    Eos # 0.1 0.0 - 0.5 K/uL    Baso # 0.05 0.00 - 0.20 K/uL    nRBC 0 0 /100 WBC    Gran% 87.6 (H) 38.0 - 73.0 %    Lymph% 5.9 (L) 18.0 - 48.0 %    Mono% 4.8 4.0 - 15.0 %    Eosinophil% 0.7 0.0 - 8.0 %    Basophil% 0.4 0.0 - 1.9 %    Differential Method Automated    Comprehensive metabolic panel   Result Value Ref Range    Sodium 138 136 - 145 mmol/L    Potassium 3.5 3.5 - 5.1 mmol/L    Chloride 113 (H) 95 - 110 mmol/L    CO2 19 (L) 23 - 29 mmol/L    Glucose 103 70 - 110 mg/dL    BUN, Bld 7 6 - 20 mg/dL    Creatinine 0.8 0.5 - 1.4 mg/dL    Calcium 7.7 (L) 8.7 - 10.5 mg/dL    Total Protein 5.1 (L) 6.0 - 8.4 g/dL    Albumin 2.5 (L) 3.5 - 5.2 g/dL    Total Bilirubin 0.5 0.1 - 1.0 mg/dL    Alkaline Phosphatase 33 (L) 55 - 135 U/L    AST 18 10 - 40 U/L    ALT 13 10 - 44 U/L    Anion Gap 6 (L) 8 - 16 mmol/L    eGFR if African American >60 >60 mL/min/1.73 m^2    eGFR if non African American >60 >60 mL/min/1.73 m^2   Immunoglobulins (IgG, IgA, IgM) Quantitative   Result Value Ref Range    IgG - Serum 763 650 - 1600 mg/dL    IgA 161 40 - 350 mg/dL    IgM 83 50 - 300 mg/dL   Procalcitonin   Result Value Ref Range    Procalcitonin 3.01 (H) <0.25 ng/mL   Rheumatoid factor   Result Value Ref Range    Rheumatoid Factor 21.0 (H) 0.0 - 15.0 IU/mL   Sedimentation rate   Result Value Ref Range    Sed Rate 13 0 - 20 mm/Hr   Vancomycin, random   Result Value Ref Range    Vancomycin, Random 3.9 Not established ug/mL   Protime-INR   Result Value Ref Range    Prothrombin Time 13.1 (H) 9.0 - 12.5 sec    INR  1.2 0.8 - 1.2   APTT   Result Value Ref Range    aPTT 31.9 21.0 - 32.0 sec   Pregnancy, urine rapid   Result Value Ref Range    Preg Test, Ur Negative    Hemoglobin   Result Value Ref Range    Hemoglobin 8.7 (L) 12.0 - 16.0 g/dL   Hematocrit   Result Value Ref Range    Hematocrit 30.1 (L) 37.0 - 48.5 %   Echo Color Flow Doppler? Yes   Result Value Ref Range    Echo EF Estimated 64 %    IVS 0.99 0.6 - 1.1 cm    LVIDD 4.33 3.5 - 6.0 cm    LVIDS 2.83 2.1 - 4.0 cm    LVOT diameter 1.89 cm    PW 0.90 0.6 - 1.1 cm    IVC ostium 0.90 cm    IVRT 59.98 ms    HR echo 93 1/minute    AV LVOT peak gradient 4 mmHg    LVOT peak gunner 0.99 m/s    LVOT peak VTI 18.95 cm    RVOT peak gunner 0.64 m/s    RVOT peak VTI 16.41 cm    LA size 3.15 cm    Left Atrium Major Axis 4.75 cm    Left Atrium Minor Axis 4.26 cm    RA Major Axis 3.58 cm    AV mean gradient 8 mmHg    Ao peak gunner 1.96 m/s    Ao VTI 34.68 cm    MV Peak A Gunner 0.63 m/s    E wave decelartion time 196.13 ms    MV Peak E Gunner 1.20 m/s    E/A ratio 1.90     Ascending aorta 2.37 cm    Proximal aorta 2.51 cm    BSA 1.44 m2    FS 35 28 - 44 %    LV mass 133.27 g    Left Ventricle Relative Wall Thickness 0.42 cm    AV valve area 1.53 cm2    AV Velocity Ratio 0.51     AV index (prosthetic) 0.55     LVOT area 2.8 cm2    LVOT stroke volume 53.14 cm3    AV peak gradient 15 mmHg    LV Mass Index 92 g/m2    Right Atrial Pressure (from IVC) 3 mmHg   Type & Screen   Result Value Ref Range    Group & Rh A POS     Indirect Mabel NEG    Prepare RBC 1 Unit   Result Value Ref Range    UNIT NUMBER [id number]     Product Code K4585X69     DISPENSE STATUS ISSUED     CODING SYSTEM [id number]     Unit Blood Type Code 6200     Unit Blood Type A POS     Unit Expiration [id number]        Imaging Results:  Imaging Results          X-Ray Chest AP Portable (Final result)  Result time 02/09/20 07:14:15    Final result by John France MD (02/09/20 07:14:15)                 Impression:      No acute  findings.      Electronically signed by: John France MD  Date:    02/09/2020  Time:    07:14             Narrative:    EXAMINATION:  XR CHEST AP PORTABLE    CLINICAL HISTORY:  Sepsis;    TECHNIQUE:  Single frontal view of the chest was performed.    COMPARISON:  None    FINDINGS:  The cardiomediastinal silhouette is normal.    Lungs are clear.  Extreme lung apices excluded from examination.    Bones are unremarkable.                            4:19 AM: Per ED physician, pt's CXR results: No acute findings.    The EKG was ordered, reviewed, and independently interpreted by the ED provider.  Interpretation time: 0225  Rate: 119 BPM  Rhythm: sinus tachycardia  Interpretation: No acute ST changes. No STEMI.           The Emergency Provider reviewed the vital signs and test results, which are outlined above.     ED Discussion     5:18 AM: Discussed case with Williams Sagastume MD ([facility]). Dr. Sagastume agrees with current care and management of pt and accepts admission.   Admitting Service: [facility]  Admitting Physician: Dr. Sagastume  Admit to: Observation/Med-Tele    5:28 AM: Re-evaluated pt. I have discussed test results, shared treatment plan, and the need for admission with patient at bedside. Pt expresses understanding at this time and agrees with all information. All questions answered. Pt has no further questions or concerns at this time. Pt is ready for admit.       Medical Decision Making:   Clinical Tests:   Lab Tests: Ordered and Reviewed  Radiological Study: Ordered and Reviewed  Medical Tests: Ordered and Reviewed           ED Medication(s):  Medications   acetaminophen tablet 650 mg (has no administration in time range)   ondansetron injection 4 mg (4 mg Intravenous Given 2/9/20 1542)   diphenhydrAMINE capsule 25 mg (has no administration in time range)   aluminum-magnesium hydroxide-simethicone 200-200-20 mg/5 mL suspension 30 mL (has no administration in time range)   guaifenesin 100 mg/5 ml  syrup 200 mg (has no administration in time range)   albuterol-ipratropium 2.5 mg-0.5 mg/3 mL nebulizer solution 3 mL (has no administration in time range)   0.9 % NaCl with KCl 20 mEq infusion ( Intravenous New Bag 2/9/20 0629)   ferrous sulfate EC tablet 325 mg (325 mg Oral Not Given 2/10/20 2100)   cefTRIAXone (ROCEPHIN) 2 g in dextrose 5 % 50 mL IVPB (2 g Intravenous New Bag 2/9/20 2237)   0.9%  NaCl infusion (for blood administration) (has no administration in time range)   vancomycin 750 mg in dextrose 5 % 250 mL IVPB (ready to mix system) (750 mg Intravenous Trough Due 30 minutes Before Dose 2/11/20 2100)   sodium chloride 0.9% bolus 1,413 mL (0 mL/kg × 47.1 kg Intravenous Stopped 2/9/20 0444)   piperacillin-tazobactam 4.5 g in dextrose 5 % 100 mL IVPB (ready to mix system) (0 g Intravenous Stopped 2/9/20 0420)   ketorolac injection 15 mg (15 mg Intravenous Given 2/9/20 0350)   acetaminophen tablet 1,000 mg (1,000 mg Oral Given 2/9/20 0350)   lactated ringers bolus 1,000 mL (0 mLs Intravenous Stopped 2/9/20 0541)   vancomycin in dextrose 5 % 1 gram/250 mL IVPB 1,000 mg (0 mg/kg × 47.1 kg Intravenous Stopped 2/9/20 0730)   iron sucrose injection 200 mg (200 mg Intravenous Given 2/9/20 1729)   sodium chloride 0.9% bolus 500 mL (500 mLs Intravenous New Bag 2/10/20 0845)   magnesium sulfate in dextrose IVPB (premix) 1 g (1 g Intravenous New Bag 2/10/20 0844)   iohexol (OMNIPAQUE) oral solution 100 mL (30 mLs Oral Given 2/10/20 1617)   iohexol (OMNIPAQUE 350) injection 100 mL (75 mLs Intravenous Given 2/10/20 1736)       Current Discharge Medication List                  Scribe Attestation:   Scribe #1: I performed the above scribed service and the documentation accurately describes the services I performed. I attest to the accuracy of the note.     Attending:   Physician Attestation Statement for Scribe #1: IPedro MD, personally performed the services described in this documentation, as scribed by  Wilbert Florez, in my presence, and it is both accurate and complete.           Clinical Impression       ICD-10-CM ICD-9-CM   1. Sepsis A41.9 038.9     995.91   2. Fever of unknown origin (FUO) R50.9 780.60       Disposition:   Disposition: Placed in Observation  Condition: Serious             Pedro Berrios MD  02/10/20 1931

## 2020-02-09 NOTE — LETTER
February 12, 2020         [id number] [place] [address]  [facility] [place] [address]  Phone: [phone]  Fax: [phone]       Patient: Danielle Brown   YOB: 2000  Date of Visit: 02/12/2020    To Whom It May Concern:    Prerna Brown  was admitted to [facility] on 02/09/2020 and discharged on 02/12/2020. She may return to work/school on 2/13/2020 with no restrictions. If you have any questions or concerns, or if I can be of further assistance, please do not hesitate to contact me.    Sincerely,    Bryan Omalley LPN

## 2020-02-09 NOTE — ASSESSMENT & PLAN NOTE
Negative for influenza and HIV  But patient's mother had cough, congestion (URI type symptoms)  Likely etiology for patient sepsis.  Continue supportive care  Bronchodilators, IV fluids, antitussives

## 2020-02-09 NOTE — HPI
Ms. Brown is a young 19-year-old  female, currently a student in nursing, with history of septic episode in October 2018, presented to the ED complaining of fever, nausea, vomiting, diarrhea since early yesterday morning.  She states that her mother who just arrived from [place] yesterday has been having cold, cough symptoms and patient immediately started having similar symptoms.  Patient did not have the flu shot this year, but she tested negative for influenza in the ED.  Fever 102.9, , WBC 68815, 0% bands, lactic acid 1.4.  Urinalysis unremarkable.  Chest x-ray without infiltrates, masses or effusions.  No clear source of infection however patient reports that due to heavy menstrual periods she overuses tampons frequently.  But denies lower abdominal discomfort.  She just removed the tampon in the ED after 12 hr.  Patient has not made an appointment to see an OBGYN yet for heavy menstrual periods.  Per ED physician, patient looked toxic upon initial presentation to the ED, but after 2 L of IV fluids she is less toxic appearing. There was a concern of tampon induced staphylococcal toxic shock syndrome and hence she was started on IV vancomycin, along with IV Zosyn.    Admit diagnosis sepsis likely secondary to acute viral syndrome versus others.

## 2020-02-09 NOTE — SIGNIFICANT EVENT
Patient examined at bedside this am. Non-toxic appearing. No pelvic pain reported. States she ran out of birth control a month ago. Has been experiencing heavy menses. Does not meet criteria for toxic shock syndrome. In setting of fever with vomiting and diarrhea likely viral illness. Will keep abx on for now. Blood cultures pending. Iron studies showed severe iron deficiency. Venofer given once. Ferrous sulfate started.

## 2020-02-09 NOTE — ASSESSMENT & PLAN NOTE
Latest blood pressure 92/55, in the setting of sepsis.  Lactic acid 1.4  Young female, continue aggressive IV fluid hydration.  Unclear etiology of patient's infection.

## 2020-02-09 NOTE — PLAN OF CARE
Patient AAOx4. VSS.  Patient remained afebrile throughout the shift.  Heart rate closely monitored   Patient ST on monitor.  IVF with KCL and IV ABX infusing to PIV  Patient remained free of falls this shift.  Plan of care reviewed.  Patient verbalized understanding.  Patient moving/turning independently  Frequent weight shifting encouraged.  Bed low, siderails up x2, wheels locked, call light in reach.  Patient instructed to call for assistance.  Hourly rounding completed.  Will continue to monitor.

## 2020-02-09 NOTE — ED NOTES
Pt presents to ED stating that she believes she is septic. Pt states that a few hours ago she began having n/v/d and feeling feverish/chills. Pt states that last time she was septic they think it was a UTI. Pt states that she has been having her period every two weeks and has been bleeding a lot therefore she might be anemic. Pt also states that she has been having instances where she left her tampon in for longer than 8 to 10 hours.

## 2020-02-10 PROBLEM — D50.9 IRON DEFICIENCY ANEMIA: Status: ACTIVE | Noted: 2020-02-10

## 2020-02-10 PROBLEM — E87.6 HYPOKALEMIA: Status: RESOLVED | Noted: 2020-02-09 | Resolved: 2020-02-10

## 2020-02-10 LAB
ABO + RH BLD: NORMAL
ALBUMIN SERPL BCP-MCNC: 2.5 G/DL (ref 3.5–5.2)
ALP SERPL-CCNC: 33 U/L (ref 55–135)
ALT SERPL W/O P-5'-P-CCNC: 13 U/L (ref 10–44)
ANION GAP SERPL CALC-SCNC: 6 MMOL/L (ref 8–16)
APTT BLDCRRT: 31.9 SEC (ref 21–32)
ASCENDING AORTA: 2.37 CM
AST SERPL-CCNC: 18 U/L (ref 10–40)
AV INDEX (PROSTH): 0.55
AV LVOT PEAK GRADIENT: 4 MMHG
AV MEAN GRADIENT: 8 MMHG
AV PEAK GRADIENT: 15 MMHG
AV VALVE AREA: 1.53 CM2
AV VELOCITY RATIO: 0.51
B-HCG UR QL: NEGATIVE
BASOPHILS # BLD AUTO: 0.05 K/UL (ref 0–0.2)
BASOPHILS NFR BLD: 0.4 % (ref 0–1.9)
BILIRUB SERPL-MCNC: 0.5 MG/DL (ref 0.1–1)
BLD GP AB SCN CELLS X3 SERPL QL: NORMAL
BLD PROD TYP BPU: NORMAL
BLOOD UNIT EXPIRATION DATE: NORMAL
BLOOD UNIT TYPE CODE: 6200
BLOOD UNIT TYPE: NORMAL
BSA FOR ECHO PROCEDURE: 1.44 M2
BUN SERPL-MCNC: 7 MG/DL (ref 6–20)
CALCIUM SERPL-MCNC: 7.7 MG/DL (ref 8.7–10.5)
CHLORIDE SERPL-SCNC: 113 MMOL/L (ref 95–110)
CO2 SERPL-SCNC: 19 MMOL/L (ref 23–29)
CODING SYSTEM: NORMAL
CREAT SERPL-MCNC: 0.8 MG/DL (ref 0.5–1.4)
CV ECHO LV RWT: 0.42 CM
DIFFERENTIAL METHOD: ABNORMAL
DISPENSE STATUS: NORMAL
DOP CALC AO PEAK VEL: 1.96 M/S
DOP CALC AO VTI: 34.68 CM
DOP CALC LVOT AREA: 2.8 CM2
DOP CALC LVOT DIAMETER: 1.89 CM
DOP CALC LVOT PEAK VEL: 0.99 M/S
DOP CALC LVOT STROKE VOLUME: 53.14 CM3
DOP CALC RVOT PEAK VEL: 0.64 M/S
DOP CALC RVOT VTI: 16.41 CM
DOP CALCLVOT PEAK VEL VTI: 18.95 CM
E WAVE DECELERATION TIME: 196.13 MS
E/A RATIO: 1.9
ECHO EF ESTIMATED: 64 %
ECHO LV POSTERIOR WALL: 0.9 CM (ref 0.6–1.1)
EOSINOPHIL # BLD AUTO: 0.1 K/UL (ref 0–0.5)
EOSINOPHIL NFR BLD: 0.7 % (ref 0–8)
ERYTHROCYTE [DISTWIDTH] IN BLOOD BY AUTOMATED COUNT: 16.2 % (ref 11.5–14.5)
ERYTHROCYTE [SEDIMENTATION RATE] IN BLOOD BY WESTERGREN METHOD: 13 MM/HR (ref 0–20)
EST. GFR  (AFRICAN AMERICAN): >60 ML/MIN/1.73 M^2
EST. GFR  (NON AFRICAN AMERICAN): >60 ML/MIN/1.73 M^2
FRACTIONAL SHORTENING: 35 % (ref 28–44)
GLUCOSE SERPL-MCNC: 103 MG/DL (ref 70–110)
HCT VFR BLD AUTO: 27.4 % (ref 37–48.5)
HCT VFR BLD AUTO: 30.1 % (ref 37–48.5)
HGB BLD-MCNC: 6.9 G/DL (ref 12–16)
HGB BLD-MCNC: 8.7 G/DL (ref 12–16)
HR TR ECHO: 93 1/MINUTE
IGA SERPL-MCNC: 161 MG/DL (ref 40–350)
IGG SERPL-MCNC: 763 MG/DL (ref 650–1600)
IGM SERPL-MCNC: 83 MG/DL (ref 50–300)
IMM GRANULOCYTES # BLD AUTO: 0.08 K/UL (ref 0–0.04)
IMM GRANULOCYTES NFR BLD AUTO: 0.6 % (ref 0–0.5)
INR PPP: 1.2 (ref 0.8–1.2)
INTERVENTRICULAR SEPTUM: 0.99 CM (ref 0.6–1.1)
IVC OSTIUM: 0.9 CM
IVRT: 59.98 MS
LA MAJOR: 4.75 CM
LA MINOR: 4.26 CM
LEFT ATRIUM SIZE: 3.15 CM
LEFT INTERNAL DIMENSION IN SYSTOLE: 2.83 CM (ref 2.1–4)
LEFT VENTRICLE MASS INDEX: 92 G/M2
LEFT VENTRICULAR INTERNAL DIMENSION IN DIASTOLE: 4.33 CM (ref 3.5–6)
LEFT VENTRICULAR MASS: 133.27 G
LYMPHOCYTES # BLD AUTO: 0.8 K/UL (ref 1–4.8)
LYMPHOCYTES NFR BLD: 5.9 % (ref 18–48)
MAGNESIUM SERPL-MCNC: 1.4 MG/DL (ref 1.6–2.6)
MCH RBC QN AUTO: 20.9 PG (ref 27–31)
MCHC RBC AUTO-ENTMCNC: 25.2 G/DL (ref 32–36)
MCV RBC AUTO: 83 FL (ref 82–98)
MONOCYTES # BLD AUTO: 0.7 K/UL (ref 0.3–1)
MONOCYTES NFR BLD: 4.8 % (ref 4–15)
MV PEAK A VEL: 0.63 M/S
MV PEAK E VEL: 1.2 M/S
NEUTROPHILS # BLD AUTO: 11.8 K/UL (ref 1.8–7.7)
NEUTROPHILS NFR BLD: 87.6 % (ref 38–73)
NRBC BLD-RTO: 0 /100 WBC
NUM UNITS TRANS PACKED RBC: NORMAL
PHOSPHATE SERPL-MCNC: 2.2 MG/DL (ref 2.7–4.5)
PLATELET # BLD AUTO: 239 K/UL (ref 150–350)
PMV BLD AUTO: 8.9 FL (ref 9.2–12.9)
POTASSIUM SERPL-SCNC: 3.5 MMOL/L (ref 3.5–5.1)
PROCALCITONIN SERPL IA-MCNC: 3.01 NG/ML
PROT SERPL-MCNC: 5.1 G/DL (ref 6–8.4)
PROTHROMBIN TIME: 13.1 SEC (ref 9–12.5)
PROX AORTA: 2.51 CM
RA MAJOR: 3.58 CM
RA PRESSURE: 3 MMHG
RBC # BLD AUTO: 3.3 M/UL (ref 4–5.4)
RHEUMATOID FACT SERPL-ACNC: 21 IU/ML (ref 0–15)
SODIUM SERPL-SCNC: 138 MMOL/L (ref 136–145)
VANCOMYCIN SERPL-MCNC: 3.9 UG/ML
WBC # BLD AUTO: 13.48 K/UL (ref 3.9–12.7)

## 2020-02-10 PROCEDURE — 85014 HEMATOCRIT: CPT

## 2020-02-10 PROCEDURE — 63600175 PHARM REV CODE 636 W HCPCS: Performed by: INTERNAL MEDICINE

## 2020-02-10 PROCEDURE — 85610 PROTHROMBIN TIME: CPT

## 2020-02-10 PROCEDURE — 96376 TX/PRO/DX INJ SAME DRUG ADON: CPT

## 2020-02-10 PROCEDURE — 83735 ASSAY OF MAGNESIUM: CPT

## 2020-02-10 PROCEDURE — 80202 ASSAY OF VANCOMYCIN: CPT

## 2020-02-10 PROCEDURE — 36415 COLL VENOUS BLD VENIPUNCTURE: CPT

## 2020-02-10 PROCEDURE — 87632 RESP VIRUS 6-11 TARGETS: CPT

## 2020-02-10 PROCEDURE — 25500020 PHARM REV CODE 255: Performed by: FAMILY MEDICINE

## 2020-02-10 PROCEDURE — 25000003 PHARM REV CODE 250: Performed by: FAMILY MEDICINE

## 2020-02-10 PROCEDURE — 80053 COMPREHEN METABOLIC PANEL: CPT

## 2020-02-10 PROCEDURE — 81025 URINE PREGNANCY TEST: CPT

## 2020-02-10 PROCEDURE — 96361 HYDRATE IV INFUSION ADD-ON: CPT

## 2020-02-10 PROCEDURE — P9016 RBC LEUKOCYTES REDUCED: HCPCS

## 2020-02-10 PROCEDURE — 86920 COMPATIBILITY TEST SPIN: CPT

## 2020-02-10 PROCEDURE — 86308 HETEROPHILE ANTIBODY SCREEN: CPT

## 2020-02-10 PROCEDURE — 82784 ASSAY IGA/IGD/IGG/IGM EACH: CPT

## 2020-02-10 PROCEDURE — 86431 RHEUMATOID FACTOR QUANT: CPT

## 2020-02-10 PROCEDURE — 86901 BLOOD TYPING SEROLOGIC RH(D): CPT

## 2020-02-10 PROCEDURE — 36430 TRANSFUSION BLD/BLD COMPNT: CPT

## 2020-02-10 PROCEDURE — 86038 ANTINUCLEAR ANTIBODIES: CPT

## 2020-02-10 PROCEDURE — 84100 ASSAY OF PHOSPHORUS: CPT

## 2020-02-10 PROCEDURE — 84145 PROCALCITONIN (PCT): CPT

## 2020-02-10 PROCEDURE — 86774 TETANUS ANTIBODY: CPT

## 2020-02-10 PROCEDURE — 85025 COMPLETE CBC W/AUTO DIFF WBC: CPT

## 2020-02-10 PROCEDURE — 21400001 HC TELEMETRY ROOM

## 2020-02-10 PROCEDURE — 85018 HEMOGLOBIN: CPT

## 2020-02-10 PROCEDURE — 85730 THROMBOPLASTIN TIME PARTIAL: CPT

## 2020-02-10 PROCEDURE — 63600175 PHARM REV CODE 636 W HCPCS: Performed by: FAMILY MEDICINE

## 2020-02-10 PROCEDURE — 85651 RBC SED RATE NONAUTOMATED: CPT

## 2020-02-10 RX ORDER — SODIUM CHLORIDE 9 MG/ML
INJECTION, SOLUTION INTRAVENOUS CONTINUOUS
Status: DISCONTINUED | OUTPATIENT
Start: 2020-02-10 | End: 2020-02-10

## 2020-02-10 RX ORDER — MAGNESIUM SULFATE 1 G/100ML
1 INJECTION INTRAVENOUS ONCE
Status: COMPLETED | OUTPATIENT
Start: 2020-02-10 | End: 2020-02-10

## 2020-02-10 RX ORDER — HYDROCODONE BITARTRATE AND ACETAMINOPHEN 500; 5 MG/1; MG/1
TABLET ORAL
Status: DISCONTINUED | OUTPATIENT
Start: 2020-02-10 | End: 2020-02-12 | Stop reason: HOSPADM

## 2020-02-10 RX ORDER — SODIUM CHLORIDE AND POTASSIUM CHLORIDE 150; 900 MG/100ML; MG/100ML
INJECTION, SOLUTION INTRAVENOUS CONTINUOUS
Status: DISCONTINUED | OUTPATIENT
Start: 2020-02-10 | End: 2020-02-10

## 2020-02-10 RX ADMIN — IOHEXOL 30 ML: 350 INJECTION, SOLUTION INTRAVENOUS at 04:02

## 2020-02-10 RX ADMIN — VANCOMYCIN HYDROCHLORIDE 750 MG: 750 INJECTION, POWDER, LYOPHILIZED, FOR SOLUTION INTRAVENOUS at 09:02

## 2020-02-10 RX ADMIN — IOHEXOL 75 ML: 350 INJECTION, SOLUTION INTRAVENOUS at 05:02

## 2020-02-10 RX ADMIN — CEFTRIAXONE 2 G: 2 INJECTION, SOLUTION INTRAVENOUS at 10:02

## 2020-02-10 RX ADMIN — MAGNESIUM SULFATE 1 G: 1 INJECTION INTRAVENOUS at 08:02

## 2020-02-10 RX ADMIN — FERROUS SULFATE TAB EC 325 MG (65 MG FE EQUIVALENT) 325 MG: 325 (65 FE) TABLET DELAYED RESPONSE at 08:02

## 2020-02-10 RX ADMIN — SODIUM CHLORIDE 500 ML: 0.9 INJECTION, SOLUTION INTRAVENOUS at 08:02

## 2020-02-10 RX ADMIN — VANCOMYCIN HYDROCHLORIDE 750 MG: 750 INJECTION, POWDER, LYOPHILIZED, FOR SOLUTION INTRAVENOUS at 08:02

## 2020-02-10 RX ADMIN — SODIUM CHLORIDE AND POTASSIUM CHLORIDE: .9; .15 SOLUTION INTRAVENOUS at 08:02

## 2020-02-10 NOTE — HPI
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 She denies any history of travel  but was recently in contact with her mum who came from [place]. She had prior history of sepsis -in 2018 and at that time , sepsis was suspected to be due to UTI. Initial Ed vitals showed - Fever 102.9, ,. CBC showed -WBC 47457.  Urinalysis unremarkable.  She reports that she since that last admission, she has recurrent almost bimonthly infections that are affecting her studies.  Ct scan -of the abdomen  12/2019- Several tiny ill-defined alveolar to somewhat nodular densities as well as some dependent subpleural atelectasis or airspace changes in both lung bases, new since previous..  Chest x-ray since admission- clear .  Respiratory  -viral panel -12/2019- negative.  No other person in the family is sick at this time. Her boyfriend is not ill too .           18-year-old female with history of nephrolithiasis, multiple UTIs who initially presented to the emergency department on 10/12 with high fevers, nausea, vomiting and chills. Patient was noted to have a fever of 105 as well as hypotension and leukocytosis in the emergency department. She was given generous IV fluids and treated with broad-spectrum IV antibiotic coverage. All workup initially was unrevealing other than a possible UTI. Blood cultures and throat cultures and respiratory viral panel will also head. These were all unrevealing. CT abdomen pelvis was also performed for completeness which only showed nephrocalcinosis with no definite urinary calculus or obstruction. Nonetheless, patient clinically improved and her WBC trended down from 17,000-5.4 prior to discharge. She remains afebrile at this time and all culture data is negative. I likely suspect she had a partially treated UTI as she was recently on amoxicillin. Therefore, we will continue treatment for her UTI with 3 additional days of p.o.  Omnicef to complete a 7-day course. She is also developed some diarrhea from the broad-spectrum IV antibiotics and therefore will prescribe her a 7-day course of Flagyl to combat any colitis. She will also be discharged home with daily probiotics. She will have close PCP follow-up on outpatient basis. Have instructed the patient to return to the emergency

## 2020-02-10 NOTE — CONSULTS
Pharmacokinetic Initial Assessment: IV Vancomycin     Assessment/Plan:  Initiate vancomycin 750 mg IV every 12 hours  Desired empiric serum trough concentration is 15 to 20 mcg/mL  Draw vancomycin trough level 30 min prior to fourth dose on 2/11 at approximately 2100  Pharmacy will continue to follow and monitor vancomycin.      Note: pt received vancomycin 1 gm loading dose yesterday @ 0548 followed by 500 mg dose @ 1729 last night & then vancomycin consult was discontinued  Consult was reordered this morning due to hypotension   A random level @ 0730 = 3.9 mcg/ml (14 hours after last vancomycin dose)  Therefore, we will use a higher maintenance dose of 750 mg every 12 hours instead of 500 mg every 12 hours    Please contact pharmacy at extension [phone] with any questions regarding this assessment.     Thank you for the consult,   Katherine McArdle, Pharm.D. 2/10/2020 10:49 AM       Patient brief summary:  Danielle Brown is a 19 y.o. female initiated on antimicrobial therapy with IV Vancomycin for treatment of suspected sepsis    Drug Allergies:   Review of patient's allergies indicates:   Allergen Reactions    Sulfamethoxazole-trimethoprim Swelling     To lips  Other reaction(s): lip swelling      Sulfa (sulfonamide antibiotics)        Actual Body Weight:   47.8 kg     Renal Function:   Estimated Creatinine Clearance: 85.4 mL/min (based on SCr of 0.8 mg/dL). -- stable    CBC (last 72 hours):  Recent Labs   Lab Result Units 02/09/20  0259 02/10/20  0517   WBC K/uL 10.30 13.48*   Hemoglobin g/dL 9.1* 6.9*   Hematocrit % 32.7* 27.4*   Platelets K/uL 302 239   Gran% % 90.9* 87.6*   Lymph% % 2.4* 5.9*   Mono% % 5.8 4.8   Eosinophil% % 0.3 0.7   Basophil% % 0.2 0.4   Differential Method  Automated Automated       Metabolic Panel (last 72 hours):  Recent Labs   Lab Result Units 02/09/20 0259 02/09/20  0412 02/10/20  0517   Sodium mmol/L 141  --  138   Potassium mmol/L 3.4*  --  3.5   Chloride mmol/L 108  --  113*    CO2 mmol/L 22*  --  19*   Glucose mg/dL 116*  --  103   Glucose, UA   --  Negative  --    BUN, Bld mg/dL 13  --  7   Creatinine mg/dL 0.8  --  0.8   Albumin g/dL 3.8  --  2.5*   Total Bilirubin mg/dL 0.6  --  0.5   Alkaline Phosphatase U/L 52*  --  33*   AST U/L 18  --  18   ALT U/L 13  --  13   Magnesium mg/dL  --   --  1.4*   Phosphorus mg/dL  --   --  2.2*       Drug levels (last 3 results):  Recent Labs   Lab Result Units 02/10/20  0730   Vancomycin, Random ug/mL 3.9       Microbiologic Results:  Microbiology Results (last 7 days)     Procedure Component Value Units Date/Time    Blood culture x two cultures. Draw prior to antibiotics. [[id number]] Collected:  02/09/20 0316    Order Status:  Completed Specimen:  Blood from Peripheral, Antecubital, Right Updated:  02/10/20 1012     Blood Culture, Routine No Growth to date      No Growth to date    Narrative:       Aerobic and anaerobic    Blood culture x two cultures. Draw prior to antibiotics. [[id number]] Collected:  02/09/20 0255    Order Status:  Completed Specimen:  Blood from Peripheral, Antecubital, Right Updated:  02/10/20 1012     Blood Culture, Routine No Growth to date      No Growth to date    Narrative:       Aerobic and anaerobic    Respiratory Viral Panel by PCR [facility]; Nasal Swab [[id number]] Collected:  02/10/20 0043    Order Status:  Sent Specimen:  Respiratory Updated:  02/10/20 0059    Influenza A & B by Molecular [[id number]] Collected:  02/09/20 0316    Order Status:  Completed Specimen:  Nasopharyngeal Swab Updated:  02/09/20 0357     Influenza A, Molecular Negative     Influenza B, Molecular Negative     Flu A & B Source Nasal swab

## 2020-02-10 NOTE — PLAN OF CARE
No acute changes during shift. Will continue to follow care plan as indicated. VS remained stable. All needs met. Safety measures in place. Will continue to monitor.

## 2020-02-10 NOTE — SUBJECTIVE & OBJECTIVE
Past Medical History:   Diagnosis Date    Sepsis        History reviewed. No pertinent surgical history.    Review of patient's allergies indicates:   Allergen Reactions    Sulfamethoxazole-trimethoprim Swelling     To lips  Other reaction(s): lip swelling      Sulfa (sulfonamide antibiotics)        Medications:  Medications Prior to Admission   Medication Sig    norelgestromin-ethinyl estradiol (ORTHO EVRA) 150-35 mcg/24 hr Place 1 patch onto the skin once a week.     Antibiotics (From admission, onward)    Start     Stop Route Frequency Ordered    02/09/20 1800  vancomycin 500 mg in dextrose 5 % 100 mL IVPB (ready to mix system)      -- IV Every 12 hours (non-standard times) 02/09/20 0821    02/09/20 1200  piperacillin-tazobactam 4.5 g in dextrose 5 % 100 mL IVPB (ready to mix system)      -- IV Every 8 hours (non-standard times) 02/09/20 0601    02/09/20 0701  vancomycin - pharmacy to dose  (vancomycin IVPB)      -- IV pharmacy to manage frequency 02/09/20 0601        Antifungals (From admission, onward)    None        Antivirals (From admission, onward)    None             There is no immunization history on file for this patient.    Family History     Problem Relation (Age of Onset)    Spinal muscular atrophy Brother        Social History     Socioeconomic History    Marital status: Single     Spouse name: Not on file    Number of children: Not on file    Years of education: Not on file    Highest education level: Not on file   Occupational History    Not on file   Social Needs    Financial resource strain: Not on file    Food insecurity:     Worry: Not on file     Inability: Not on file    Transportation needs:     Medical: Not on file     Non-medical: Not on file   Tobacco Use    Smoking status: Never Smoker    Smokeless tobacco: Never Used   Substance and Sexual Activity    Alcohol use: Never     Frequency: Never    Drug use: Never    Sexual activity: Not Currently   Lifestyle    Physical  activity:     Days per week: Not on file     Minutes per session: Not on file    Stress: Not on file   Relationships    Social connections:     Talks on phone: Not on file     Gets together: Not on file     Attends Denominational service: Not on file     Active member of club or organization: Not on file     Attends meetings of clubs or organizations: Not on file     Relationship status: Not on file   Other Topics Concern    Not on file   Social History Narrative    Not on file     Review of Systems   Constitutional: Positive for chills, diaphoresis, fatigue and fever.   HENT: Negative.  Negative for congestion, nosebleeds and sinus pressure.    Eyes: Negative.  Negative for visual disturbance.   Respiratory: Negative.  Negative for cough, chest tightness, shortness of breath and wheezing.    Cardiovascular: Negative.  Negative for chest pain, palpitations and leg swelling.   Gastrointestinal: Negative for abdominal pain, diarrhea (2-3 episodes of watery, nonbloody), nausea and vomiting.   Endocrine: Negative.  Negative for polyuria.   Genitourinary: Negative.  Negative for dysuria, flank pain, frequency and urgency.   Musculoskeletal: Positive for arthralgias. Negative for back pain, joint swelling and neck stiffness.   Skin: Negative.  Negative for color change, pallor and rash.   Allergic/Immunologic: Negative.  Negative for immunocompromised state.   Neurological: Negative for dizziness, syncope, speech difficulty, numbness and headaches.   Hematological: Negative.  Negative for adenopathy. Does not bruise/bleed easily.   Psychiatric/Behavioral: Negative.  Negative for confusion, decreased concentration and hallucinations. The patient is not nervous/anxious.    All other systems reviewed and are negative.    Objective:     Vital Signs (Most Recent):  Temp: 99.1 °F (37.3 °C) (02/09/20 2001)  Pulse: (!) 121 (02/09/20 2001)  Resp: 18 (02/09/20 2001)  BP: 129/65 (02/09/20 2001)  SpO2: 98 % (02/09/20 2001) Vital Signs  (24h Range):  Temp:  [97.9 °F (36.6 °C)-102.9 °F (39.4 °C)] 99.1 °F (37.3 °C)  Pulse:  [] 121  Resp:  [12-20] 18  SpO2:  [98 %-100 %] 98 %  BP: ()/(43-65) 129/65     Weight: 47.1 kg (103 lb 14.4 oz)  Body mass index is 19 kg/m².    Estimated Creatinine Clearance: 84.1 mL/min (based on SCr of 0.8 mg/dL).    Physical Exam   Constitutional: She is oriented to person, place, and time. No distress.   -   HENT:   Head: Normocephalic and atraumatic.   Eyes: Conjunctivae and EOM are normal. No scleral icterus.   Neck: Normal range of motion. Neck supple. No tracheal deviation present. No thyromegaly present.   Cardiovascular: Regular rhythm, normal heart sounds and intact distal pulses. Tachycardia present.   No murmur heard.  Pulmonary/Chest: Effort normal and breath sounds normal. No respiratory distress. She has no wheezes. She has no rales. She exhibits no tenderness.   Abdominal: Soft. Bowel sounds are normal. She exhibits no distension. There is no tenderness.   Musculoskeletal: Normal range of motion. She exhibits no edema or tenderness.   Neurological: She is alert and oriented to person, place, and time. No cranial nerve deficit. She exhibits normal muscle tone. Coordination normal.   Skin: Skin is warm and dry. She is not diaphoretic. No erythema.   Psychiatric: She has a normal mood and affect. Her behavior is normal. Judgment and thought content normal.   Nursing note and vitals reviewed.      Significant Labs:   Blood Culture:   Recent Labs   Lab 02/09/20 0255 02/09/20  0316   LABBLOO No Growth to date No Growth to date     BMP:   Recent Labs   Lab 02/09/20 0259   *      K 3.4*      CO2 22*   BUN 13   CREATININE 0.8   CALCIUM 9.0     CBC:   Recent Labs   Lab 02/09/20 0259   WBC 10.30   HGB 9.1*   HCT 32.7*        All pertinent labs within the past 24 hours have been reviewed.    Significant Imaging: I have reviewed all pertinent imaging results/findings within the past  24 hours.

## 2020-02-10 NOTE — SUBJECTIVE & OBJECTIVE
Interval History: Patient reports she has not been eating and drinking well. States she has a lot of stressors in her life. Reports drinking water and eating ice frequently throughout the day.     Review of Systems   Constitutional: Positive for appetite change, fatigue and fever.   Respiratory: Negative for shortness of breath.    Cardiovascular: Negative for chest pain.   Gastrointestinal: Negative for nausea and vomiting.   Skin: Negative for rash.   Neurological: Positive for weakness.   Psychiatric/Behavioral: The patient is nervous/anxious.      Objective:     Vital Signs (Most Recent):  Temp: 98.2 °F (36.8 °C) (02/10/20 1300)  Pulse: 98 (02/10/20 1301)  Resp: 20 (02/10/20 1300)  BP: 105/60 (02/10/20 1300)  SpO2: 100 % (02/10/20 1300) Vital Signs (24h Range):  Temp:  [98 °F (36.7 °C)-99.7 °F (37.6 °C)] 98.2 °F (36.8 °C)  Pulse:  [] 98  Resp:  [17-20] 20  SpO2:  [97 %-100 %] 100 %  BP: ()/(42-65) 105/60     Weight: 47.8 kg (105 lb 6.1 oz)  Body mass index is 19.27 kg/m².    Intake/Output Summary (Last 24 hours) at 2/10/2020 1512  Last data filed at 2/10/2020 0600  Gross per 24 hour   Intake 4060 ml   Output 1200 ml   Net 2860 ml      Physical Exam   Constitutional: She is oriented to person, place, and time. She appears well-developed and well-nourished. No distress.   HENT:   Head: Normocephalic and atraumatic.   Eyes:   Pale conjunctivae   Cardiovascular: Normal rate, regular rhythm and normal heart sounds. Exam reveals no gallop and no friction rub.   No murmur heard.  Pulmonary/Chest: Effort normal and breath sounds normal. No stridor. No respiratory distress. She has no wheezes. She has no rales.   Abdominal: Soft. Bowel sounds are normal. She exhibits no distension and no mass. There is no tenderness. There is no guarding.   Musculoskeletal: Normal range of motion. She exhibits no edema.   Neurological: She is alert and oriented to person, place, and time.   Skin: Skin is warm. She is not  diaphoretic. No erythema.   Psychiatric:   anxious       Significant Labs:   Recent Lab Results       02/10/20  1050   02/10/20  0912   02/10/20  0738   02/10/20  0730   02/10/20  0728        Unit Blood Type Code         6200[P]     Unit Expiration         [id number][P]     Procalcitonin               Unit Blood Type         A POS[P]     Albumin               Alkaline Phosphatase               ALT               Anion Gap               aPTT   31.9  Comment:  aPTT therapeutic range = 39-69 seconds           AST               Baso #               Basophil%               BILIRUBIN TOTAL               BUN, Bld               Calcium               Chloride               CO2               CODING SYSTEM         [id number][P]     Creatinine               CRP               Differential Method               DISPENSE STATUS         ISSUED[P]     eGFR if                eGFR if non                Eos #               Eosinophil%               Glucose               Gran # (ANC)               Gran%               Group & Rh     A POS         Hematocrit               Hemoglobin               IgA               IgG - Serum               IgM               Immature Grans (Abs)               Immature Granulocytes               INDIRECT FAITH     NEG         INR   1.2  Comment:  Coumadin Therapy:  2.0 - 3.0 for INR for all indicators except mechanical heart valves  and antiphospholipid syndromes which should use 2.5 - 3.5.             Lymph #               Lymph%               Magnesium               MCH               MCHC               MCV               Mono #               Mono%               MPV               nRBC               Phosphorus               Platelets               Potassium               Preg Test, Ur Negative             Product Code         E1264T50[P]     PROTEIN TOTAL               Protime   13.1           RBC               RDW               Rheumatoid Factor               Sed Rate                Sodium               UNIT NUMBER         [id number][P]     Vancomycin, Random       3.9       WBC                                02/10/20  0517   02/09/20  2307        Unit Blood Type Code         Unit Expiration         Procalcitonin 3.01  Comment:  A concentration < 0.25 ng/mL represents a low risk bacterial   infection.  Procalcitonin may not be accurate among patients with localized   infection, recent trauma or major surgery, immunosuppressed state,   invasive fungal infection, renal dysfunction. Decisions regarding   initiation or continuation of antibiotic therapy should not be based   solely on procalcitonin levels.         Unit Blood Type         Albumin 2.5       Alkaline Phosphatase 33       ALT 13       Anion Gap 6       aPTT         AST 18       Baso # 0.05       Basophil% 0.4       BILIRUBIN TOTAL 0.5  Comment:  For infants and newborns, interpretation of results should be based  on gestational age, weight and in agreement with clinical  observations.  Premature Infant recommended reference ranges:  Up to 24 hours.............<8.0 mg/dL  Up to 48 hours............<12.0 mg/dL  3-5 days..................<15.0 mg/dL  6-29 days.................<15.0 mg/dL         BUN, Bld 7       Calcium 7.7       Chloride 113       CO2 19       CODING SYSTEM         Creatinine 0.8       CRP   133.2     Differential Method Automated       DISPENSE STATUS         eGFR if  >60       eGFR if non  >60  Comment:  Calculation used to obtain the estimated glomerular filtration  rate (eGFR) is the CKD-EPI equation.          Eos # 0.1       Eosinophil% 0.7       Glucose 103       Gran # (ANC) 11.8       Gran% 87.6       Group & Rh         Hematocrit 27.4       Hemoglobin 6.9       IgA 161  Comment:  IgA Cord Blood Reference Range: <5 mg/dL.       IgG - Serum 763  Comment:  IgG Cord Blood Reference Range: 650-1600 mg/dL.       IgM 83  Comment:  IgM Cord Blood Reference Range: <25 mg/dL.        Immature Grans (Abs) 0.08  Comment:  Mild elevation in immature granulocytes is non specific and   can be seen in a variety of conditions including stress response,   acute inflammation, trauma and pregnancy. Correlation with other   laboratory and clinical findings is essential.         Immature Granulocytes 0.6       INDIRECT FAITH         INR         Lymph # 0.8       Lymph% 5.9       Magnesium 1.4       MCH 20.9       MCHC 25.2       MCV 83       Mono # 0.7       Mono% 4.8       MPV 8.9       nRBC 0       Phosphorus 2.2       Platelets 239       Potassium 3.5       Preg Test, Ur         Product Code         PROTEIN TOTAL 5.1       Protime         RBC 3.30       RDW 16.2       Rheumatoid Factor 21.0       Sed Rate 13       Sodium 138       UNIT NUMBER         Vancomycin, Random         WBC 13.48           All pertinent labs within the past 24 hours have been reviewed.    Significant Imaging: I have reviewed all pertinent imaging results/findings within the past 24 hours.

## 2020-02-10 NOTE — PROGRESS NOTES
[facility] Hospital Medicine  Progress Note    Patient Name: Danielle Brown  MRN: [id number]  Patient Class: IP- Inpatient   Admission Date: 2/9/2020  Length of Stay: 0 days  Attending Physician: Bernard Fink MD  Primary Care Provider: Primary Doctor No        Subjective:     Principal Problem:Sepsis        HPI:  Ms. Brown is a young 19-year-old  female, currently a student in nursing, with history of septic episode in October 2018, presented to the ED complaining of fever, nausea, vomiting, diarrhea since early yesterday morning.  She states that her mother who just arrived from [place] yesterday has been having cold, cough symptoms and patient immediately started having similar symptoms.  Patient did not have the flu shot this year, but she tested negative for influenza in the ED.  Fever 102.9, , WBC 38028, 0% bands, lactic acid 1.4.  Urinalysis unremarkable.  Chest x-ray without infiltrates, masses or effusions.  No clear source of infection however patient reports that due to heavy menstrual periods she overuses tampons frequently.  But denies lower abdominal discomfort.  She just removed the tampon in the ED after 12 hr.  Patient has not made an appointment to see an OBGYN yet for heavy menstrual periods.  Per ED physician, patient looked toxic upon initial presentation to the ED, but after 2 L of IV fluids she is less toxic appearing. There was a concern of tampon induced staphylococcal toxic shock syndrome and hence she was started on IV vancomycin, along with IV Zosyn.    Admit diagnosis sepsis likely secondary to acute viral syndrome versus others.    Overview/Hospital Course:  2/10:  Autoimmune workup started by ID. Procal elevated. CT chest abd and pelvis ordered. Hgb < 7.0. No current active signs of bleeding. Iron deficiency anemia. Will transfuse 1 unit and repeat h/h 1 hour post transfusion.     Interval History: Patient reports she has not been eating and drinking well.  States she has a lot of stressors in her life. Reports drinking water and eating ice frequently throughout the day.     Review of Systems   Constitutional: Positive for appetite change, fatigue and fever.   Respiratory: Negative for shortness of breath.    Cardiovascular: Negative for chest pain.   Gastrointestinal: Negative for nausea and vomiting.   Skin: Negative for rash.   Neurological: Positive for weakness.   Psychiatric/Behavioral: The patient is nervous/anxious.      Objective:     Vital Signs (Most Recent):  Temp: 98.2 °F (36.8 °C) (02/10/20 1300)  Pulse: 98 (02/10/20 1301)  Resp: 20 (02/10/20 1300)  BP: 105/60 (02/10/20 1300)  SpO2: 100 % (02/10/20 1300) Vital Signs (24h Range):  Temp:  [98 °F (36.7 °C)-99.7 °F (37.6 °C)] 98.2 °F (36.8 °C)  Pulse:  [] 98  Resp:  [17-20] 20  SpO2:  [97 %-100 %] 100 %  BP: ()/(42-65) 105/60     Weight: 47.8 kg (105 lb 6.1 oz)  Body mass index is 19.27 kg/m².    Intake/Output Summary (Last 24 hours) at 2/10/2020 1512  Last data filed at 2/10/2020 0600  Gross per 24 hour   Intake 4060 ml   Output 1200 ml   Net 2860 ml      Physical Exam   Constitutional: She is oriented to person, place, and time. She appears well-developed and well-nourished. No distress.   HENT:   Head: Normocephalic and atraumatic.   Eyes:   Pale conjunctivae   Cardiovascular: Normal rate, regular rhythm and normal heart sounds. Exam reveals no gallop and no friction rub.   No murmur heard.  Pulmonary/Chest: Effort normal and breath sounds normal. No stridor. No respiratory distress. She has no wheezes. She has no rales.   Abdominal: Soft. Bowel sounds are normal. She exhibits no distension and no mass. There is no tenderness. There is no guarding.   Musculoskeletal: Normal range of motion. She exhibits no edema.   Neurological: She is alert and oriented to person, place, and time.   Skin: Skin is warm. She is not diaphoretic. No erythema.   Psychiatric:   anxious       Significant Labs:    Recent Lab Results       02/10/20  1050   02/10/20  0912   02/10/20  0738   02/10/20  0730   02/10/20  0728        Unit Blood Type Code         6200[P]     Unit Expiration         [id number][P]     Procalcitonin               Unit Blood Type         A POS[P]     Albumin               Alkaline Phosphatase               ALT               Anion Gap               aPTT   31.9  Comment:  aPTT therapeutic range = 39-69 seconds           AST               Baso #               Basophil%               BILIRUBIN TOTAL               BUN, Bld               Calcium               Chloride               CO2               CODING SYSTEM         [id number][P]     Creatinine               CRP               Differential Method               DISPENSE STATUS         ISSUED[P]     eGFR if                eGFR if non                Eos #               Eosinophil%               Glucose               Gran # (ANC)               Gran%               Group & Rh     A POS         Hematocrit               Hemoglobin               IgA               IgG - Serum               IgM               Immature Grans (Abs)               Immature Granulocytes               INDIRECT FAITH     NEG         INR   1.2  Comment:  Coumadin Therapy:  2.0 - 3.0 for INR for all indicators except mechanical heart valves  and antiphospholipid syndromes which should use 2.5 - 3.5.             Lymph #               Lymph%               Magnesium               MCH               MCHC               MCV               Mono #               Mono%               MPV               nRBC               Phosphorus               Platelets               Potassium               Preg Test, Ur Negative             Product Code         X5091S27[P]     PROTEIN TOTAL               Protime   13.1           RBC               RDW               Rheumatoid Factor               Sed Rate               Sodium               UNIT NUMBER         [id number][P]      Vancomycin, Random       3.9       WBC                                02/10/20  0517   02/09/20  2307        Unit Blood Type Code         Unit Expiration         Procalcitonin 3.01  Comment:  A concentration < 0.25 ng/mL represents a low risk bacterial   infection.  Procalcitonin may not be accurate among patients with localized   infection, recent trauma or major surgery, immunosuppressed state,   invasive fungal infection, renal dysfunction. Decisions regarding   initiation or continuation of antibiotic therapy should not be based   solely on procalcitonin levels.         Unit Blood Type         Albumin 2.5       Alkaline Phosphatase 33       ALT 13       Anion Gap 6       aPTT         AST 18       Baso # 0.05       Basophil% 0.4       BILIRUBIN TOTAL 0.5  Comment:  For infants and newborns, interpretation of results should be based  on gestational age, weight and in agreement with clinical  observations.  Premature Infant recommended reference ranges:  Up to 24 hours.............<8.0 mg/dL  Up to 48 hours............<12.0 mg/dL  3-5 days..................<15.0 mg/dL  6-29 days.................<15.0 mg/dL         BUN, Bld 7       Calcium 7.7       Chloride 113       CO2 19       CODING SYSTEM         Creatinine 0.8       CRP   133.2     Differential Method Automated       DISPENSE STATUS         eGFR if  >60       eGFR if non  >60  Comment:  Calculation used to obtain the estimated glomerular filtration  rate (eGFR) is the CKD-EPI equation.          Eos # 0.1       Eosinophil% 0.7       Glucose 103       Gran # (ANC) 11.8       Gran% 87.6       Group & Rh         Hematocrit 27.4       Hemoglobin 6.9       IgA 161  Comment:  IgA Cord Blood Reference Range: <5 mg/dL.       IgG - Serum 763  Comment:  IgG Cord Blood Reference Range: 650-1600 mg/dL.       IgM 83  Comment:  IgM Cord Blood Reference Range: <25 mg/dL.       Immature Grans (Abs) 0.08  Comment:  Mild elevation in immature  granulocytes is non specific and   can be seen in a variety of conditions including stress response,   acute inflammation, trauma and pregnancy. Correlation with other   laboratory and clinical findings is essential.         Immature Granulocytes 0.6       INDIRECT FAITH         INR         Lymph # 0.8       Lymph% 5.9       Magnesium 1.4       MCH 20.9       MCHC 25.2       MCV 83       Mono # 0.7       Mono% 4.8       MPV 8.9       nRBC 0       Phosphorus 2.2       Platelets 239       Potassium 3.5       Preg Test, Ur         Product Code         PROTEIN TOTAL 5.1       Protime         RBC 3.30       RDW 16.2       Rheumatoid Factor 21.0       Sed Rate 13       Sodium 138       UNIT NUMBER         Vancomycin, Random         WBC 13.48           All pertinent labs within the past 24 hours have been reviewed.    Significant Imaging: I have reviewed all pertinent imaging results/findings within the past 24 hours.      Assessment/Plan:      * Sepsis  Fever 102.9, , WBC 45564, 0% bands, lactic acid 1.4.  Unclear etiology of patient's infection, but strongly suspect acute viral syndrome.  ED physician was concerned about tampon induced staphylococcal toxic shock syndrome, though unlikely/rare.  On IV vancomycin, IV Zosyn empirically.  Deescalate antibiotics as soon as feasible.  Follow up on cultures.  Continue normal saline 125 cc with potassium supplementation.  She received 2 L normal saline boluses in the ED.  Patient concerned about recurrent sepsis requiring hospitalization in the past.  Requesting for ID consult/evaluation (??  Immunodeficiency syndrome at baseline??  HIV negative.    2/10:  Cont vanc. Zosyn switched to rocephin by ID  Cultures no growth to date  Respiratory viral panel being sent  CT chest abd and pelvis ordered by ID   Immunodeficiency workup started     Iron deficiency anemia  2/10:  Was given venofer 200mg x1  Ferrous sulfate started  Patient reports not using her birthcontrol patch  properly  Which is likely the cause of her frequent and heavy menstrual cycles  Currently does not have any vaginal bleeding per report  Will transfuse 1 unit prbc  Denies any melena or BRBPR  Monitor H/H  Recommended outpatient follow up with obgyn        Hypotension  Latest blood pressure 92/55, in the setting of sepsis.  Lactic acid 1.4  Young female, continue aggressive IV fluid hydration.  Unclear etiology of patient's infection.    2/10:  IVF  Currently receiving blood which should help with BP  Cont to monitor  Patient states she tends to have low BP at baseline     Acute viral syndrome  Negative for influenza and HIV  But patient's mother had cough, congestion (URI type symptoms)  Likely etiology for patient sepsis.  Continue supportive care  Bronchodilators, IV fluids, antitussives    2/10:  Respiratory viral panel sent  Continue symptomatic treatment       VTE Risk Mitigation (From admission, onward)         Ordered     Place sequential compression device  Until discontinued      02/09/20 0559                      Bernard Fink MD  Department of Hospital Medicine   [facility]

## 2020-02-10 NOTE — ASSESSMENT & PLAN NOTE
Fever 102.9, , WBC 66108, 0% bands, lactic acid 1.4.  Unclear etiology of patient's infection, but strongly suspect acute viral syndrome.  ED physician was concerned about tampon induced staphylococcal toxic shock syndrome, though unlikely/rare.  On IV vancomycin, IV Zosyn empirically.  Deescalate antibiotics as soon as feasible.  Follow up on cultures.  Continue normal saline 125 cc with potassium supplementation.  She received 2 L normal saline boluses in the ED.  Patient concerned about recurrent sepsis requiring hospitalization in the past.  Requesting for ID consult/evaluation (??  Immunodeficiency syndrome at baseline??  HIV negative.    2/10:  Cont vanc. Zosyn switched to rocephin by ID  Cultures no growth to date  Respiratory viral panel being sent  CT chest abd and pelvis ordered by ID   Immunodeficiency workup started

## 2020-02-10 NOTE — PLAN OF CARE
SW met with patient at bedside to assess for discharge planning.  Patient was alert and oriented.  Patient denied utilizing any respiratory/medical assistive equipment or home health services before coming to the hospital.  Patient identified her help at home as her mother, and stated that she manages her own healthcare.  Patient denied the need for all assistive equipment, home health services, and all other community resources upon discharge.  Patient anticipates discharging with no needs.   SW provided a transitional care folder, information on advanced directives, information on pharmacy bedside delivery, and discharge planning begins on admission with contact information for any needs/questions.     D/C Plan:  Home  PCP:  Dr. Justina Hollingsworth  Preferred Pharmacy:  [facility]  Discharge transportation:  Family  [facility]:  Pharmacy Bedside Delivery: Yes       02/10/20 1140   Discharge Assessment   Assessment Type Discharge Planning Assessment   Confirmed/corrected address and phone number on facesheet? Yes   Assessment information obtained from? Patient   Expected Length of Stay (days)   (tbd)   Communicated expected length of stay with patient/caregiver yes   Prior to hospitilization cognitive status: Alert/Oriented   Prior to hospitalization functional status: Independent   Current cognitive status: Alert/Oriented   Current Functional Status: Independent   Lives With parent(s)   Able to Return to Prior Arrangements yes   Is patient able to care for self after discharge? Yes   Who are your caregiver(s) and their phone number(s)? Yeison Tsang (mother)  [phone] and Karen Garnica (Father) [phone]   Patient's perception of discharge disposition home or selfcare   Readmission Within the Last 30 Days no previous admission in last 30 days   Patient currently being followed by outpatient case management? No   Patient currently receives any other outside agency services? No   Equipment Currently Used  at Home none   Do you have any problems affording any of your prescribed medications? No   Is the patient taking medications as prescribed? yes   Does the patient have transportation home? Yes   Transportation Anticipated family or friend will provide   Dialysis Name and Scheduled days N/A   Does the patient receive services at the [facility]? No   Discharge Plan A Home with family   DME Needed Upon Discharge  none   Patient/Family in Agreement with Plan yes

## 2020-02-10 NOTE — ASSESSMENT & PLAN NOTE
Which virus is unclear -influenza assay is negative , will send extended viral panel .  Continue supportive care

## 2020-02-10 NOTE — ASSESSMENT & PLAN NOTE
Negative for influenza and HIV  But patient's mother had cough, congestion (URI type symptoms)  Likely etiology for patient sepsis.  Continue supportive care  Bronchodilators, IV fluids, antitussives    2/10:  Respiratory viral panel sent  Continue symptomatic treatment

## 2020-02-10 NOTE — ASSESSMENT & PLAN NOTE
2/10:  Was given venofer 200mg x1  Ferrous sulfate started  Patient reports not using her birthcontrol patch properly  Which is likely the cause of her frequent and heavy menstrual cycles  Currently does not have any vaginal bleeding per report  Will transfuse 1 unit prbc  Denies any melena or BRBPR  Monitor H/H  Recommended outpatient follow up with obgyn

## 2020-02-10 NOTE — ASSESSMENT & PLAN NOTE
Latest blood pressure 92/55, in the setting of sepsis.  Lactic acid 1.4  Young female, continue aggressive IV fluid hydration.  Unclear etiology of patient's infection.    2/10:  IVF  Currently receiving blood which should help with BP  Cont to monitor  Patient states she tends to have low BP at baseline

## 2020-02-10 NOTE — ASSESSMENT & PLAN NOTE
Source is unclear -UA and chest x-ray are clear.  Will send viral respiratory panel, will follow blood cultures.  Will stop Vanco/zosyn and follow fever curve.  Will do cardiac echo.  Send immunodeficency work up

## 2020-02-10 NOTE — HOSPITAL COURSE
Patient was admitted for sepsis 2/2 to viral etiology and pna. ID was consulted on case as patient reported recurrent infections. Autoimmune and immunodeficiency workup initiated. Patient was started on broad spectrum abx and dced on augmentin. CT chest abd and pelvis showed patchy infiltrates which could represent pna. Patient was also found to have microcytic anemia likely 2/2 to menometrorrhagia due to inconsistent birth control use. She was counseled to use birth control patches as directed. She was transfused 1 unit of prbc. Labs show SHANIQUA. Venofer was given once along with ferrous sulfate starting bid. Patient was discharge home with instructions to follow up with ID outpatient.

## 2020-02-10 NOTE — CONSULTS
[facility]  Infectious Disease  Consult Note    Patient Name: Danielle Brown  MRN: [id number]  Admission Date: 2/9/2020  Hospital Length of Stay: 0 days  Attending Physician: Bernard Fink MD  Primary Care Provider: Primary Doctor No     Isolation Status: No active isolations    Patient information was obtained from patient, past medical records and ER records.      Consults  Assessment/Plan:     * Sepsis  Source is unclear -UA and chest x-ray are clear.  Will send viral respiratory panel, will follow blood cultures.  Will stop Vanco/zosyn and follow fever curve.  Will do cardiac echo.  Send immunodeficency work up    Hypokalemia  Will replete    Acute viral syndrome  Which virus is unclear -influenza assay is negative , will send extended viral panel .  Continue supportive care          Thank you for your consult. I will follow-up with patient. Please contact us if you have any additional questions.    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 She denies any history of travel  but was recently in contact with her mum who came from [place]. She had prior history of sepsis -in 2018 and at that time , sepsis was suspected to be due to UTI. Initial Ed vitals showed - Fever 102.9, ,. CBC showed -WBC 34241.  Urinalysis unremarkable.  She reports that she since that last admission, she has recurrent almost bimonthly infections that are affecting her studies.  Ct scan -of the abdomen  12/2019- Several tiny ill-defined alveolar to somewhat nodular densities as well as some dependent subpleural atelectasis or airspace changes in both lung bases, new since previous..  Chest x-ray since admission- clear .  Respiratory  -viral panel -12/2019- negative.  No other person in the family is sick at this time. Her boyfriend is not  ill too .           18-year-old female with history of nephrolithiasis, multiple UTIs who initially presented to the emergency department on 10/12 with high fevers, nausea, vomiting and chills. Patient was noted to have a fever of 105 as well as hypotension and leukocytosis in the emergency department. She was given generous IV fluids and treated with broad-spectrum IV antibiotic coverage. All workup initially was unrevealing other than a possible UTI. Blood cultures and throat cultures and respiratory viral panel will also head. These were all unrevealing. CT abdomen pelvis was also performed for completeness which only showed nephrocalcinosis with no definite urinary calculus or obstruction. Nonetheless, patient clinically improved and her WBC trended down from 17,000-5.4 prior to discharge. She remains afebrile at this time and all culture data is negative. I likely suspect she had a partially treated UTI as she was recently on amoxicillin. Therefore, we will continue treatment for her UTI with 3 additional days of p.o. Omnicef to complete a 7-day course. She is also developed some diarrhea from the broad-spectrum IV antibiotics and therefore will prescribe her a 7-day course of Flagyl to combat any colitis. She will also be discharged home with daily probiotics. She will have close PCP follow-up on outpatient basis. Have instructed the patient to return to the emergency       Past Medical History:   Diagnosis Date    Sepsis        History reviewed. No pertinent surgical history.    Review of patient's allergies indicates:   Allergen Reactions    Sulfamethoxazole-trimethoprim Swelling     To lips  Other reaction(s): lip swelling      Sulfa (sulfonamide antibiotics)        Medications:  Medications Prior to Admission   Medication Sig    norelgestromin-ethinyl estradiol (ORTHO EVRA) 150-35 mcg/24 hr Place 1 patch onto the skin once a week.     Antibiotics (From admission, onward)    Start     Stop Route  Frequency Ordered    02/09/20 1800  vancomycin 500 mg in dextrose 5 % 100 mL IVPB (ready to mix system)      -- IV Every 12 hours (non-standard times) 02/09/20 0821    02/09/20 1200  piperacillin-tazobactam 4.5 g in dextrose 5 % 100 mL IVPB (ready to mix system)      -- IV Every 8 hours (non-standard times) 02/09/20 0601    02/09/20 0701  vancomycin - pharmacy to dose  (vancomycin IVPB)      -- IV pharmacy to manage frequency 02/09/20 0601        Antifungals (From admission, onward)    None        Antivirals (From admission, onward)    None             There is no immunization history on file for this patient.    Family History     Problem Relation (Age of Onset)    Spinal muscular atrophy Brother        Social History     Socioeconomic History    Marital status: Single     Spouse name: Not on file    Number of children: Not on file    Years of education: Not on file    Highest education level: Not on file   Occupational History    Not on file   Social Needs    Financial resource strain: Not on file    Food insecurity:     Worry: Not on file     Inability: Not on file    Transportation needs:     Medical: Not on file     Non-medical: Not on file   Tobacco Use    Smoking status: Never Smoker    Smokeless tobacco: Never Used   Substance and Sexual Activity    Alcohol use: Never     Frequency: Never    Drug use: Never    Sexual activity: Not Currently   Lifestyle    Physical activity:     Days per week: Not on file     Minutes per session: Not on file    Stress: Not on file   Relationships    Social connections:     Talks on phone: Not on file     Gets together: Not on file     Attends Jainism service: Not on file     Active member of club or organization: Not on file     Attends meetings of clubs or organizations: Not on file     Relationship status: Not on file   Other Topics Concern    Not on file   Social History Narrative    Not on file     Review of Systems   Constitutional: Positive for  chills, diaphoresis, fatigue and fever.   HENT: Negative.  Negative for congestion, nosebleeds and sinus pressure.    Eyes: Negative.  Negative for visual disturbance.   Respiratory: Negative.  Negative for cough, chest tightness, shortness of breath and wheezing.    Cardiovascular: Negative.  Negative for chest pain, palpitations and leg swelling.   Gastrointestinal: Negative for abdominal pain, diarrhea (2-3 episodes of watery, nonbloody), nausea and vomiting.   Endocrine: Negative.  Negative for polyuria.   Genitourinary: Negative.  Negative for dysuria, flank pain, frequency and urgency.   Musculoskeletal: Positive for arthralgias. Negative for back pain, joint swelling and neck stiffness.   Skin: Negative.  Negative for color change, pallor and rash.   Allergic/Immunologic: Negative.  Negative for immunocompromised state.   Neurological: Negative for dizziness, syncope, speech difficulty, numbness and headaches.   Hematological: Negative.  Negative for adenopathy. Does not bruise/bleed easily.   Psychiatric/Behavioral: Negative.  Negative for confusion, decreased concentration and hallucinations. The patient is not nervous/anxious.    All other systems reviewed and are negative.    Objective:     Vital Signs (Most Recent):  Temp: 99.1 °F (37.3 °C) (02/09/20 2001)  Pulse: (!) 121 (02/09/20 2001)  Resp: 18 (02/09/20 2001)  BP: 129/65 (02/09/20 2001)  SpO2: 98 % (02/09/20 2001) Vital Signs (24h Range):  Temp:  [97.9 °F (36.6 °C)-102.9 °F (39.4 °C)] 99.1 °F (37.3 °C)  Pulse:  [] 121  Resp:  [12-20] 18  SpO2:  [98 %-100 %] 98 %  BP: ()/(43-65) 129/65     Weight: 47.1 kg (103 lb 14.4 oz)  Body mass index is 19 kg/m².    Estimated Creatinine Clearance: 84.1 mL/min (based on SCr of 0.8 mg/dL).    Physical Exam   Constitutional: She is oriented to person, place, and time. No distress.   -   HENT:   Head: Normocephalic and atraumatic.   Eyes: Conjunctivae and EOM are normal. No scleral icterus.   Neck: Normal  range of motion. Neck supple. No tracheal deviation present. No thyromegaly present.   Cardiovascular: Regular rhythm, normal heart sounds and intact distal pulses. Tachycardia present.   No murmur heard.  Pulmonary/Chest: Effort normal and breath sounds normal. No respiratory distress. She has no wheezes. She has no rales. She exhibits no tenderness.   Abdominal: Soft. Bowel sounds are normal. She exhibits no distension. There is no tenderness.   Musculoskeletal: Normal range of motion. She exhibits no edema or tenderness.   Neurological: She is alert and oriented to person, place, and time. No cranial nerve deficit. She exhibits normal muscle tone. Coordination normal.   Skin: Skin is warm and dry. She is not diaphoretic. No erythema.   Psychiatric: She has a normal mood and affect. Her behavior is normal. Judgment and thought content normal.   Nursing note and vitals reviewed.      Significant Labs:   Blood Culture:   Recent Labs   Lab 02/09/20  0255 02/09/20  0316   LABBLOO No Growth to date No Growth to date     BMP:   Recent Labs   Lab 02/09/20  0259   *      K 3.4*      CO2 22*   BUN 13   CREATININE 0.8   CALCIUM 9.0     CBC:   Recent Labs   Lab 02/09/20  0259   WBC 10.30   HGB 9.1*   HCT 32.7*        All pertinent labs within the past 24 hours have been reviewed.    Significant Imaging: I have reviewed all pertinent imaging results/findings within the past 24 hours.

## 2020-02-11 LAB
ANA SER QL IF: NORMAL
ANION GAP SERPL CALC-SCNC: 8 MMOL/L (ref 8–16)
BASOPHILS # BLD AUTO: 0.04 K/UL (ref 0–0.2)
BASOPHILS NFR BLD: 0.4 % (ref 0–1.9)
BUN SERPL-MCNC: 3 MG/DL (ref 6–20)
CALCIUM SERPL-MCNC: 9.3 MG/DL (ref 8.7–10.5)
CHLORIDE SERPL-SCNC: 108 MMOL/L (ref 95–110)
CO2 SERPL-SCNC: 26 MMOL/L (ref 23–29)
CREAT SERPL-MCNC: 0.8 MG/DL (ref 0.5–1.4)
DIFFERENTIAL METHOD: ABNORMAL
EOSINOPHIL # BLD AUTO: 0.4 K/UL (ref 0–0.5)
EOSINOPHIL NFR BLD: 4 % (ref 0–8)
ERYTHROCYTE [DISTWIDTH] IN BLOOD BY AUTOMATED COUNT: 16.7 % (ref 11.5–14.5)
EST. GFR  (AFRICAN AMERICAN): >60 ML/MIN/1.73 M^2
EST. GFR  (NON AFRICAN AMERICAN): >60 ML/MIN/1.73 M^2
GLUCOSE SERPL-MCNC: 105 MG/DL (ref 70–110)
HCT VFR BLD AUTO: 33.4 % (ref 37–48.5)
HGB BLD-MCNC: 9.5 G/DL (ref 12–16)
IMM GRANULOCYTES # BLD AUTO: 0.02 K/UL (ref 0–0.04)
IMM GRANULOCYTES NFR BLD AUTO: 0.2 % (ref 0–0.5)
LYMPHOCYTES # BLD AUTO: 1.6 K/UL (ref 1–4.8)
LYMPHOCYTES NFR BLD: 16.2 % (ref 18–48)
MAGNESIUM SERPL-MCNC: 1.6 MG/DL (ref 1.6–2.6)
MCH RBC QN AUTO: 21.8 PG (ref 27–31)
MCHC RBC AUTO-ENTMCNC: 28.4 G/DL (ref 32–36)
MCV RBC AUTO: 77 FL (ref 82–98)
MONOCYTES # BLD AUTO: 0.9 K/UL (ref 0.3–1)
MONOCYTES NFR BLD: 9 % (ref 4–15)
NEUTROPHILS # BLD AUTO: 6.9 K/UL (ref 1.8–7.7)
NEUTROPHILS NFR BLD: 70.2 % (ref 38–73)
NRBC BLD-RTO: 0 /100 WBC
PHOSPHATE SERPL-MCNC: 3.2 MG/DL (ref 2.7–4.5)
PLATELET # BLD AUTO: 290 K/UL (ref 150–350)
PMV BLD AUTO: 9.3 FL (ref 9.2–12.9)
POTASSIUM SERPL-SCNC: 3.7 MMOL/L (ref 3.5–5.1)
RBC # BLD AUTO: 4.35 M/UL (ref 4–5.4)
SODIUM SERPL-SCNC: 142 MMOL/L (ref 136–145)
WBC # BLD AUTO: 9.88 K/UL (ref 3.9–12.7)

## 2020-02-11 PROCEDURE — 63600175 PHARM REV CODE 636 W HCPCS: Performed by: FAMILY MEDICINE

## 2020-02-11 PROCEDURE — 25000003 PHARM REV CODE 250: Performed by: FAMILY MEDICINE

## 2020-02-11 PROCEDURE — 85025 COMPLETE CBC W/AUTO DIFF WBC: CPT

## 2020-02-11 PROCEDURE — 84100 ASSAY OF PHOSPHORUS: CPT

## 2020-02-11 PROCEDURE — 21400001 HC TELEMETRY ROOM

## 2020-02-11 PROCEDURE — 25000003 PHARM REV CODE 250: Performed by: INTERNAL MEDICINE

## 2020-02-11 PROCEDURE — 80048 BASIC METABOLIC PNL TOTAL CA: CPT

## 2020-02-11 PROCEDURE — 36415 COLL VENOUS BLD VENIPUNCTURE: CPT

## 2020-02-11 PROCEDURE — 83735 ASSAY OF MAGNESIUM: CPT

## 2020-02-11 PROCEDURE — 63600175 PHARM REV CODE 636 W HCPCS: Performed by: INTERNAL MEDICINE

## 2020-02-11 RX ORDER — AMOXICILLIN AND CLAVULANATE POTASSIUM 875; 125 MG/1; MG/1
1 TABLET, FILM COATED ORAL EVERY 12 HOURS
Status: DISCONTINUED | OUTPATIENT
Start: 2020-02-11 | End: 2020-02-11

## 2020-02-11 RX ORDER — FUROSEMIDE 10 MG/ML
20 INJECTION INTRAMUSCULAR; INTRAVENOUS ONCE
Status: COMPLETED | OUTPATIENT
Start: 2020-02-11 | End: 2020-02-11

## 2020-02-11 RX ORDER — AMOXICILLIN AND CLAVULANATE POTASSIUM 400; 57 MG/5ML; MG/5ML
875 POWDER, FOR SUSPENSION ORAL EVERY 12 HOURS
Status: DISCONTINUED | OUTPATIENT
Start: 2020-02-11 | End: 2020-02-12 | Stop reason: HOSPADM

## 2020-02-11 RX ADMIN — FUROSEMIDE 20 MG: 10 INJECTION, SOLUTION INTRAMUSCULAR; INTRAVENOUS at 01:02

## 2020-02-11 RX ADMIN — VANCOMYCIN HYDROCHLORIDE 750 MG: 750 INJECTION, POWDER, LYOPHILIZED, FOR SOLUTION INTRAVENOUS at 08:02

## 2020-02-11 RX ADMIN — ONDANSETRON 4 MG: 2 INJECTION INTRAMUSCULAR; INTRAVENOUS at 09:02

## 2020-02-11 RX ADMIN — AMOXICILLIN AND CLAVULANATE POTASSIUM 875.2 MG: 400; 57 POWDER, FOR SUSPENSION ORAL at 04:02

## 2020-02-11 NOTE — SUBJECTIVE & OBJECTIVE
Interval History:   19 year old woman with sepsis syndrome .  Serum procalcitonin is elevated.  CT scan of the chest -Patchy infiltrates are seen within the lower lobes with interlobular septal thickening which could reflect interstitial pneumonia or mild edema.  Trace pleural effusions.  Periportal edema is noted which can be seen with aggressive hydration or acute hepatitis.  Gallbladder wall thickening is noted which also can be seen with aggressive hydration or intrinsic liver disease.   She has worsening dyspnoea.  Review of Systems   Constitutional: Positive for fever. Negative for chills, diaphoresis and fatigue.   HENT: Negative.  Negative for congestion, nosebleeds and sinus pressure.    Eyes: Negative.  Negative for visual disturbance.   Respiratory: Negative.  Negative for cough, chest tightness, shortness of breath and wheezing.    Cardiovascular: Negative.  Negative for chest pain, palpitations and leg swelling.   Gastrointestinal: Negative for abdominal pain, diarrhea (-), nausea and vomiting.   Endocrine: Negative.  Negative for polyuria.   Genitourinary: Negative.  Negative for dysuria, flank pain, frequency and urgency.   Musculoskeletal: Positive for arthralgias. Negative for back pain, joint swelling and neck stiffness.   Skin: Negative.  Negative for color change, pallor and rash.   Allergic/Immunologic: Negative.  Negative for immunocompromised state.   Neurological: Negative for dizziness, syncope, speech difficulty, numbness and headaches.   Hematological: Negative.  Negative for adenopathy. Does not bruise/bleed easily.   Psychiatric/Behavioral: Negative.  Negative for confusion, decreased concentration and hallucinations. The patient is not nervous/anxious.    All other systems reviewed and are negative.    Objective:     Vital Signs (Most Recent):  Temp: 99.6 °F (37.6 °C) (02/11/20 0410)  Pulse: 96 (02/11/20 0410)  Resp: 18 (02/11/20 0410)  BP: (!) 95/51 (02/11/20 0410)  SpO2: 98 %  (02/11/20 0410) Vital Signs (24h Range):  Temp:  [98.1 °F (36.7 °C)-99.6 °F (37.6 °C)] 99.6 °F (37.6 °C)  Pulse:  [] 96  Resp:  [17-20] 18  SpO2:  [97 %-100 %] 98 %  BP: ()/(51-81) 95/51     Weight: 45 kg (99 lb 3.3 oz)  Body mass index is 18.15 kg/m².    Estimated Creatinine Clearance: 80.4 mL/min (based on SCr of 0.8 mg/dL).    Physical Exam   Constitutional: She is oriented to person, place, and time. No distress.   -   HENT:   Head: Normocephalic and atraumatic.   Eyes: Conjunctivae and EOM are normal. No scleral icterus.   Neck: Normal range of motion. Neck supple. No tracheal deviation present. No thyromegaly present.   Cardiovascular: Regular rhythm, normal heart sounds and intact distal pulses. Tachycardia present.   No murmur heard.  Pulmonary/Chest: Effort normal and breath sounds normal. No respiratory distress. She has no wheezes. She has no rales. She exhibits no tenderness.   Abdominal: Soft. Bowel sounds are normal. She exhibits no distension. There is no tenderness.   Musculoskeletal: Normal range of motion. She exhibits no edema or tenderness.   Neurological: She is alert and oriented to person, place, and time. No cranial nerve deficit. She exhibits normal muscle tone. Coordination normal.   Skin: Skin is warm and dry. She is not diaphoretic. No erythema.   Psychiatric: She has a normal mood and affect. Her behavior is normal. Judgment and thought content normal.   Nursing note and vitals reviewed.      Significant Labs:   BMP:   Recent Labs   Lab 02/10/20  0517         K 3.5   *   CO2 19*   BUN 7   CREATININE 0.8   CALCIUM 7.7*   MG 1.4*     CBC:   Recent Labs   Lab 02/10/20  0517 02/10/20  1759   WBC 13.48*  --    HGB 6.9* 8.7*   HCT 27.4* 30.1*     --      All pertinent labs within the past 24 hours have been reviewed.    Significant Imaging: I have reviewed all pertinent imaging results/findings within the past 24 hours.

## 2020-02-11 NOTE — ASSESSMENT & PLAN NOTE
Source is unclear -UA and chest x-ray are clear.  Will send viral respiratory panel, will follow blood cultures.  Will stop Vanco/zosyn and follow fever curve.  Will do cardiac echo.  Send immunodeficency work up    2/10- will continue vanco/rocephin -  Will follow cbc in AM .

## 2020-02-11 NOTE — ASSESSMENT & PLAN NOTE
Which virus is unclear -influenza assay is negative , will send extended viral panel .  Continue supportive care    2/11- will follow viral respiratory panel .  Continue close monitoring .Can follow in the clinic .

## 2020-02-11 NOTE — PROGRESS NOTES
[facility]  Infectious Disease  Progress Note    Patient Name: Danielle Brown  MRN: [id number]  Admission Date: 2/9/2020  Length of Stay: 1 days  Attending Physician: Bernard Fink MD  Primary Care Provider: Primary Doctor No    Isolation Status: No active isolations  Assessment/Plan:      * Sepsis  Source is unclear -UA and chest x-ray are clear.  Will send viral respiratory panel, will follow blood cultures.  Will stop Vanco/zosyn and follow fever curve.  Will do cardiac echo.  Send immunodeficency work up    2/10- will continue vanco/rocephin -  Will follow cbc in AM .    Iron deficiency anemia  Will monitor post transfusion         Anticipated Disposition:     Thank you for your consult. I will follow-up with patient. Please contact us if you have any additional questions.    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 She denies any history of travel  but was recently in contact with her mum who came from [place]. She had prior history of sepsis -in 2018 and at that time , sepsis was suspected to be due to UTI. Initial Ed vitals showed - Fever 102.9, ,. CBC showed -WBC 09688.  Urinalysis unremarkable.  She reports that she since that last admission, she has recurrent almost bimonthly infections that are affecting her studies.  Ct scan -of the abdomen  12/2019- Several tiny ill-defined alveolar to somewhat nodular densities as well as some dependent subpleural atelectasis or airspace changes in both lung bases, new since previous..  Chest x-ray since admission- clear .  Respiratory  -viral panel -12/2019- negative.  No other person in the family is sick at this time. Her boyfriend is not ill too .           18-year-old female with history of nephrolithiasis, multiple UTIs who initially presented to the emergency  department on 10/12 with high fevers, nausea, vomiting and chills. Patient was noted to have a fever of 105 as well as hypotension and leukocytosis in the emergency department. She was given generous IV fluids and treated with broad-spectrum IV antibiotic coverage. All workup initially was unrevealing other than a possible UTI. Blood cultures and throat cultures and respiratory viral panel will also head. These were all unrevealing. CT abdomen pelvis was also performed for completeness which only showed nephrocalcinosis with no definite urinary calculus or obstruction. Nonetheless, patient clinically improved and her WBC trended down from 17,000-5.4 prior to discharge. She remains afebrile at this time and all culture data is negative. I likely suspect she had a partially treated UTI as she was recently on amoxicillin. Therefore, we will continue treatment for her UTI with 3 additional days of p.o. Omnicef to complete a 7-day course. She is also developed some diarrhea from the broad-spectrum IV antibiotics and therefore will prescribe her a 7-day course of Flagyl to combat any colitis. She will also be discharged home with daily probiotics. She will have close PCP follow-up on outpatient basis. Have instructed the patient to return to the emergency     Interval History:   19 year old woman with sepsis syndrome .  Serum procalcitonin is elevated.  CT scan of the chest -Patchy infiltrates are seen within the lower lobes with interlobular septal thickening which could reflect interstitial pneumonia or mild edema.  Trace pleural effusions.  Periportal edema is noted which can be seen with aggressive hydration or acute hepatitis.  Gallbladder wall thickening is noted which also can be seen with aggressive hydration or intrinsic liver disease.   She has worsening dyspnoea.  Review of Systems   Constitutional: Positive for fever. Negative for chills, diaphoresis and fatigue.   HENT: Negative.  Negative for congestion,  nosebleeds and sinus pressure.    Eyes: Negative.  Negative for visual disturbance.   Respiratory: Negative.  Negative for cough, chest tightness, shortness of breath and wheezing.    Cardiovascular: Negative.  Negative for chest pain, palpitations and leg swelling.   Gastrointestinal: Negative for abdominal pain, diarrhea (-), nausea and vomiting.   Endocrine: Negative.  Negative for polyuria.   Genitourinary: Negative.  Negative for dysuria, flank pain, frequency and urgency.   Musculoskeletal: Positive for arthralgias. Negative for back pain, joint swelling and neck stiffness.   Skin: Negative.  Negative for color change, pallor and rash.   Allergic/Immunologic: Negative.  Negative for immunocompromised state.   Neurological: Negative for dizziness, syncope, speech difficulty, numbness and headaches.   Hematological: Negative.  Negative for adenopathy. Does not bruise/bleed easily.   Psychiatric/Behavioral: Negative.  Negative for confusion, decreased concentration and hallucinations. The patient is not nervous/anxious.    All other systems reviewed and are negative.    Objective:     Vital Signs (Most Recent):  Temp: 99.6 °F (37.6 °C) (02/11/20 0410)  Pulse: 96 (02/11/20 0410)  Resp: 18 (02/11/20 0410)  BP: (!) 95/51 (02/11/20 0410)  SpO2: 98 % (02/11/20 0410) Vital Signs (24h Range):  Temp:  [98.1 °F (36.7 °C)-99.6 °F (37.6 °C)] 99.6 °F (37.6 °C)  Pulse:  [] 96  Resp:  [17-20] 18  SpO2:  [97 %-100 %] 98 %  BP: ()/(51-81) 95/51     Weight: 45 kg (99 lb 3.3 oz)  Body mass index is 18.15 kg/m².    Estimated Creatinine Clearance: 80.4 mL/min (based on SCr of 0.8 mg/dL).    Physical Exam   Constitutional: She is oriented to person, place, and time. No distress.   -   HENT:   Head: Normocephalic and atraumatic.   Eyes: Conjunctivae and EOM are normal. No scleral icterus.   Neck: Normal range of motion. Neck supple. No tracheal deviation present. No thyromegaly present.   Cardiovascular: Regular rhythm,  normal heart sounds and intact distal pulses. Tachycardia present.   No murmur heard.  Pulmonary/Chest: Effort normal and breath sounds normal. No respiratory distress. She has no wheezes. She has no rales. She exhibits no tenderness.   Abdominal: Soft. Bowel sounds are normal. She exhibits no distension. There is no tenderness.   Musculoskeletal: Normal range of motion. She exhibits no edema or tenderness.   Neurological: She is alert and oriented to person, place, and time. No cranial nerve deficit. She exhibits normal muscle tone. Coordination normal.   Skin: Skin is warm and dry. She is not diaphoretic. No erythema.   Psychiatric: She has a normal mood and affect. Her behavior is normal. Judgment and thought content normal.   Nursing note and vitals reviewed.      Significant Labs:   BMP:   Recent Labs   Lab 02/10/20  0517         K 3.5   *   CO2 19*   BUN 7   CREATININE 0.8   CALCIUM 7.7*   MG 1.4*     CBC:   Recent Labs   Lab 02/10/20  0517 02/10/20  1759   WBC 13.48*  --    HGB 6.9* 8.7*   HCT 27.4* 30.1*     --      All pertinent labs within the past 24 hours have been reviewed.    Significant Imaging: I have reviewed all pertinent imaging results/findings within the past 24 hours.

## 2020-02-11 NOTE — PROGRESS NOTES
[facility]  Infectious Disease  Progress Note    Patient Name: Danielle Brown  MRN: [id number]  Admission Date: 2/9/2020  Length of Stay: 1 days  Attending Physician: Bernard Fink MD  Primary Care Provider: Primary Doctor No    Isolation Status: No active isolations  Assessment/Plan:      * Sepsis  Source is unclear -UA and chest x-ray are clear.  Will send viral respiratory panel, will follow blood cultures.  Will stop Vanco/zosyn and follow fever curve.  Will do cardiac echo.  Send immunodeficency work up    2/10- will continue vanco/rocephin -  Will follow cbc in AM .  2/11-will switch to PO Augmentin .  Will need 7 more days of therapy     Acute viral syndrome  Which virus is unclear -influenza assay is negative , will send extended viral panel .  Continue supportive care    2/11- will follow viral respiratory panel .  Continue close monitoring .Can follow in the clinic .        Anticipated Disposition:     Thank you for your consult. I will follow-up with patient. Please contact us if you have any additional questions.    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 She denies any history of travel  but was recently in contact with her mum who came from [place]. She had prior history of sepsis -in 2018 and at that time , sepsis was suspected to be due to UTI. Initial Ed vitals showed - Fever 102.9, ,. CBC showed -WBC 39875.  Urinalysis unremarkable.  She reports that she since that last admission, she has recurrent almost bimonthly infections that are affecting her studies.  Ct scan -of the abdomen  12/2019- Several tiny ill-defined alveolar to somewhat nodular densities as well as some dependent subpleural atelectasis or airspace changes in both lung bases, new since previous..  Chest x-ray since admission-  clear .  Respiratory  -viral panel -12/2019- negative.  No other person in the family is sick at this time. Her boyfriend is not ill too .           18-year-old female with history of nephrolithiasis, multiple UTIs who initially presented to the emergency department on 10/12 with high fevers, nausea, vomiting and chills. Patient was noted to have a fever of 105 as well as hypotension and leukocytosis in the emergency department. She was given generous IV fluids and treated with broad-spectrum IV antibiotic coverage. All workup initially was unrevealing other than a possible UTI. Blood cultures and throat cultures and respiratory viral panel will also head. These were all unrevealing. CT abdomen pelvis was also performed for completeness which only showed nephrocalcinosis with no definite urinary calculus or obstruction. Nonetheless, patient clinically improved and her WBC trended down from 17,000-5.4 prior to discharge. She remains afebrile at this time and all culture data is negative. I likely suspect she had a partially treated UTI as she was recently on amoxicillin. Therefore, we will continue treatment for her UTI with 3 additional days of p.o. Omnicef to complete a 7-day course. She is also developed some diarrhea from the broad-spectrum IV antibiotics and therefore will prescribe her a 7-day course of Flagyl to combat any colitis. She will also be discharged home with daily probiotics. She will have close PCP follow-up on outpatient basis. Have instructed the patient to return to the emergency     Interval History:   19 year old woman with sepsis syndrome .  Serum procalcitonin is elevated.  CT scan of the chest -Patchy infiltrates are seen within the lower lobes with interlobular septal thickening which could reflect interstitial pneumonia or mild edema.  Trace pleural effusions.  Periportal edema is noted which can be seen with aggressive hydration or acute hepatitis.  Gallbladder wall thickening is noted  which also can be seen with aggressive hydration or intrinsic liver disease.   She has worsening dyspnoea.  02/11/2020- she feels better . She is afebrile.  Review of Systems   Constitutional: Positive for fever. Negative for chills, diaphoresis and fatigue.   HENT: Negative.  Negative for congestion, nosebleeds and sinus pressure.    Eyes: Negative.  Negative for visual disturbance.   Respiratory: Negative.  Negative for cough, chest tightness, shortness of breath and wheezing.    Cardiovascular: Negative.  Negative for chest pain, palpitations and leg swelling.   Gastrointestinal: Negative for abdominal pain, diarrhea (-), nausea and vomiting.   Endocrine: Negative.  Negative for polyuria.   Genitourinary: Negative.  Negative for dysuria, flank pain, frequency and urgency.   Musculoskeletal: Positive for arthralgias. Negative for back pain, joint swelling and neck stiffness.   Skin: Negative.  Negative for color change, pallor and rash.   Allergic/Immunologic: Negative.  Negative for immunocompromised state.   Neurological: Negative for dizziness, syncope, speech difficulty, numbness and headaches.   Hematological: Negative.  Negative for adenopathy. Does not bruise/bleed easily.   Psychiatric/Behavioral: Negative.  Negative for confusion, decreased concentration and hallucinations. The patient is not nervous/anxious.    All other systems reviewed and are negative.    Objective:     Vital Signs (Most Recent):  Temp: 98.7 °F (37.1 °C) (02/11/20 0748)  Pulse: 97 (02/11/20 0748)  Resp: 18 (02/11/20 0748)  BP: (!) 109/53 (02/11/20 0748)  SpO2: 95 % (02/11/20 0748) Vital Signs (24h Range):  Temp:  [98.1 °F (36.7 °C)-99.6 °F (37.6 °C)] 98.7 °F (37.1 °C)  Pulse:  [] 97  Resp:  [18-20] 18  SpO2:  [95 %-100 %] 95 %  BP: ()/(51-81) 109/53     Weight: 45 kg (99 lb 3.3 oz)  Body mass index is 18.15 kg/m².    Estimated Creatinine Clearance: 80.4 mL/min (based on SCr of 0.8 mg/dL).    Physical Exam   Constitutional:  She is oriented to person, place, and time. No distress.   -   HENT:   Head: Normocephalic and atraumatic.   Eyes: Conjunctivae and EOM are normal. No scleral icterus.   Neck: Normal range of motion. Neck supple. No tracheal deviation present. No thyromegaly present.   Cardiovascular: Regular rhythm, normal heart sounds and intact distal pulses. Tachycardia present.   No murmur heard.  Pulmonary/Chest: Effort normal and breath sounds normal. No respiratory distress. She has no wheezes. She has no rales. She exhibits no tenderness.   Abdominal: Soft. Bowel sounds are normal. She exhibits no distension. There is no tenderness.   Musculoskeletal: Normal range of motion. She exhibits no edema or tenderness.   Neurological: She is alert and oriented to person, place, and time. No cranial nerve deficit. She exhibits normal muscle tone. Coordination normal.   Skin: Skin is warm and dry. She is not diaphoretic. No erythema.   Psychiatric: She has a normal mood and affect. Her behavior is normal. Judgment and thought content normal.   Nursing note and vitals reviewed.      Significant Labs:   BMP:   Recent Labs   Lab 02/11/20  0839         K 3.7      CO2 26   BUN 3*   CREATININE 0.8   CALCIUM 9.3   MG 1.6     CBC:   Recent Labs   Lab 02/10/20  0517 02/10/20  1759 02/11/20  0830   WBC 13.48*  --  9.88   HGB 6.9* 8.7* 9.5*   HCT 27.4* 30.1* 33.4*     --  290     All pertinent labs within the past 24 hours have been reviewed.    Significant Imaging: I have reviewed all pertinent imaging results/findings within the past 24 hours.

## 2020-02-11 NOTE — PLAN OF CARE
Continued plan of care. Patient switched from IV to PO antibiotics. Patient reports feeling better today. Encouraged patient to deep breath and cough. No significant events during shift.     Problem: Adult Inpatient Plan of Care  Goal: Plan of Care Review  Outcome: Ongoing, Progressing     Problem: Infection  Goal: Infection Symptom Resolution  Intervention: Prevent or Manage Infection  Flowsheets (Taken 2/11/2020 1756)  Infection Management: aseptic technique maintained     Problem: Fluid Volume Excess  Goal: Fluid Balance  Intervention: Monitor and Manage Hypervolemia  Flowsheets (Taken 2/11/2020 8719)  Skin Protection: adhesive use limited; tubing/devices free from skin contact  Fluid/Electrolyte Management: fluids adjusted

## 2020-02-11 NOTE — SUBJECTIVE & OBJECTIVE
Interval History:   19 year old woman with sepsis syndrome .  Serum procalcitonin is elevated.  CT scan of the chest -Patchy infiltrates are seen within the lower lobes with interlobular septal thickening which could reflect interstitial pneumonia or mild edema.  Trace pleural effusions.  Periportal edema is noted which can be seen with aggressive hydration or acute hepatitis.  Gallbladder wall thickening is noted which also can be seen with aggressive hydration or intrinsic liver disease.   She has worsening dyspnoea.  02/11/2020- she feels better . She is afebrile.  Review of Systems   Constitutional: Positive for fever. Negative for chills, diaphoresis and fatigue.   HENT: Negative.  Negative for congestion, nosebleeds and sinus pressure.    Eyes: Negative.  Negative for visual disturbance.   Respiratory: Negative.  Negative for cough, chest tightness, shortness of breath and wheezing.    Cardiovascular: Negative.  Negative for chest pain, palpitations and leg swelling.   Gastrointestinal: Negative for abdominal pain, diarrhea (-), nausea and vomiting.   Endocrine: Negative.  Negative for polyuria.   Genitourinary: Negative.  Negative for dysuria, flank pain, frequency and urgency.   Musculoskeletal: Positive for arthralgias. Negative for back pain, joint swelling and neck stiffness.   Skin: Negative.  Negative for color change, pallor and rash.   Allergic/Immunologic: Negative.  Negative for immunocompromised state.   Neurological: Negative for dizziness, syncope, speech difficulty, numbness and headaches.   Hematological: Negative.  Negative for adenopathy. Does not bruise/bleed easily.   Psychiatric/Behavioral: Negative.  Negative for confusion, decreased concentration and hallucinations. The patient is not nervous/anxious.    All other systems reviewed and are negative.    Objective:     Vital Signs (Most Recent):  Temp: 98.7 °F (37.1 °C) (02/11/20 0748)  Pulse: 97 (02/11/20 0748)  Resp: 18 (02/11/20  0748)  BP: (!) 109/53 (02/11/20 0748)  SpO2: 95 % (02/11/20 0748) Vital Signs (24h Range):  Temp:  [98.1 °F (36.7 °C)-99.6 °F (37.6 °C)] 98.7 °F (37.1 °C)  Pulse:  [] 97  Resp:  [18-20] 18  SpO2:  [95 %-100 %] 95 %  BP: ()/(51-81) 109/53     Weight: 45 kg (99 lb 3.3 oz)  Body mass index is 18.15 kg/m².    Estimated Creatinine Clearance: 80.4 mL/min (based on SCr of 0.8 mg/dL).    Physical Exam   Constitutional: She is oriented to person, place, and time. No distress.   -   HENT:   Head: Normocephalic and atraumatic.   Eyes: Conjunctivae and EOM are normal. No scleral icterus.   Neck: Normal range of motion. Neck supple. No tracheal deviation present. No thyromegaly present.   Cardiovascular: Regular rhythm, normal heart sounds and intact distal pulses. Tachycardia present.   No murmur heard.  Pulmonary/Chest: Effort normal and breath sounds normal. No respiratory distress. She has no wheezes. She has no rales. She exhibits no tenderness.   Abdominal: Soft. Bowel sounds are normal. She exhibits no distension. There is no tenderness.   Musculoskeletal: Normal range of motion. She exhibits no edema or tenderness.   Neurological: She is alert and oriented to person, place, and time. No cranial nerve deficit. She exhibits normal muscle tone. Coordination normal.   Skin: Skin is warm and dry. She is not diaphoretic. No erythema.   Psychiatric: She has a normal mood and affect. Her behavior is normal. Judgment and thought content normal.   Nursing note and vitals reviewed.      Significant Labs:   BMP:   Recent Labs   Lab 02/11/20  0839         K 3.7      CO2 26   BUN 3*   CREATININE 0.8   CALCIUM 9.3   MG 1.6     CBC:   Recent Labs   Lab 02/10/20  0517 02/10/20  1759 02/11/20  0830   WBC 13.48*  --  9.88   HGB 6.9* 8.7* 9.5*   HCT 27.4* 30.1* 33.4*     --  290     All pertinent labs within the past 24 hours have been reviewed.    Significant Imaging: I have reviewed all pertinent  imaging results/findings within the past 24 hours.

## 2020-02-11 NOTE — PROGRESS NOTES
[facility] Hospital Medicine  Progress Note    Patient Name: Danielle Brown  MRN: [id number]  Patient Class: IP- Inpatient   Admission Date: 2/9/2020  Length of Stay: 1 days  Attending Physician: Bernard Fink MD  Primary Care Provider: Primary Doctor No        Subjective:     Principal Problem:Sepsis        HPI:  Ms. 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  She states that her mother who just arrived from [place] yesterday has been having cold, cough symptoms and patient immediately started having similar symptoms.  Patient did not have the flu shot this year, but she tested negative for influenza in the ED.  Fever 102.9, , WBC 65642, 0% bands, lactic acid 1.4.  Urinalysis unremarkable.  Chest x-ray without infiltrates, masses or effusions.  No clear source of infection however patient reports that due to heavy menstrual periods she overuses tampons frequently.  But denies lower abdominal discomfort.  She just removed the tampon in the ED after 12 hr.  Patient has not made an appointment to see an OBGYN yet for heavy menstrual periods.  Per ED physician, patient looked toxic upon initial presentation to the ED, but after 2 L of IV fluids she is less toxic appearing. There was a concern of tampon induced staphylococcal toxic shock syndrome and hence she was started on IV vancomycin, along with IV Zosyn.    Admit diagnosis sepsis likely secondary to acute viral syndrome versus others.    Overview/Hospital Course:  2/10:  Autoimmune workup started by ID. Procal elevated. CT chest abd and pelvis ordered. Hgb < 7.0. No current active signs of bleeding. Iron deficiency anemia. Will transfuse 1 unit and repeat h/h 1 hour post transfusion.     2/11:  ABx switched to PO augmentin, patient continues to have episodes of  hypotension although she tends to run low chronically. Will monitor overnight. DC in am if stable.     Interval History: Reports sob overnight. Was started on lasix once. Symptoms improved. Denies any other issues.     Review of Systems   Constitutional: Positive for appetite change, fatigue and fever.   Respiratory: Negative for shortness of breath.    Cardiovascular: Negative for chest pain.   Gastrointestinal: Negative for nausea and vomiting.   Skin: Negative for rash.   Neurological: Positive for weakness.   Psychiatric/Behavioral: The patient is nervous/anxious.      Objective:     Vital Signs (Most Recent):  Temp: 98.3 °F (36.8 °C) (02/11/20 1539)  Pulse: 88 (02/11/20 1539)  Resp: 16 (02/11/20 1539)  BP: 105/64 (02/11/20 1539)  SpO2: 98 % (02/11/20 1539) Vital Signs (24h Range):  Temp:  [98.1 °F (36.7 °C)-99.6 °F (37.6 °C)] 98.3 °F (36.8 °C)  Pulse:  [] 88  Resp:  [16-18] 16  SpO2:  [95 %-100 %] 98 %  BP: ()/(51-81) 105/64     Weight: 45 kg (99 lb 3.3 oz)  Body mass index is 18.15 kg/m².    Intake/Output Summary (Last 24 hours) at 2/11/2020 1612  Last data filed at 2/11/2020 1500  Gross per 24 hour   Intake 2653 ml   Output 2150 ml   Net 503 ml      Physical Exam   Constitutional: She is oriented to person, place, and time. She appears well-developed and well-nourished. No distress.   HENT:   Head: Normocephalic and atraumatic.   Eyes:   Pale conjunctivae   Cardiovascular: Normal rate, regular rhythm and normal heart sounds. Exam reveals no gallop and no friction rub.   No murmur heard.  Pulmonary/Chest: Effort normal and breath sounds normal. No stridor. No respiratory distress. She has no wheezes. She has no rales.   Abdominal: Soft. Bowel sounds are normal. She exhibits no distension and no mass. There is no tenderness. There is no guarding.   Musculoskeletal: Normal range of motion. She exhibits no edema.   Neurological: She is alert and oriented to person, place, and time.   Skin: Skin is  warm. She is not diaphoretic. No erythema.   Psychiatric:   anxious       Significant Labs:   Recent Lab Results       02/11/20  0839   02/11/20  0830   02/10/20  1759        Anion Gap 8         Baso #   0.04       Basophil%   0.4       BUN, Bld 3         Calcium 9.3         Chloride 108         CO2 26         Creatinine 0.8         Differential Method   Automated       eGFR if  >60         eGFR if non  >60  Comment:  Calculation used to obtain the estimated glomerular filtration  rate (eGFR) is the CKD-EPI equation.            Eos #   0.4       Eosinophil%   4.0       Glucose 105         Gran # (ANC)   6.9       Gran%   70.2       Hematocrit   33.4 30.1     Hemoglobin   9.5 8.7     Immature Grans (Abs)   0.02  Comment:  Mild elevation in immature granulocytes is non specific and   can be seen in a variety of conditions including stress response,   acute inflammation, trauma and pregnancy. Correlation with other   laboratory and clinical findings is essential.         Immature Granulocytes   0.2       Lymph #   1.6       Lymph%   16.2       Magnesium 1.6         MCH   21.8       MCHC   28.4       MCV   77       Mono #   0.9       Mono%   9.0       MPV   9.3       nRBC   0       Phosphorus 3.2         Platelets   290       Potassium 3.7         RBC   4.35       RDW   16.7       Sodium 142         WBC   9.88           All pertinent labs within the past 24 hours have been reviewed.    Significant Imaging: I have reviewed all pertinent imaging results/findings within the past 24 hours.      Assessment/Plan:      * Sepsis  Fever 102.9, , WBC 16938, 0% bands, lactic acid 1.4.  Unclear etiology of patient's infection, but strongly suspect acute viral syndrome.  ED physician was concerned about tampon induced staphylococcal toxic shock syndrome, though unlikely/rare.  On IV vancomycin, IV Zosyn empirically.  Deescalate antibiotics as soon as feasible.  Follow up on cultures.  Continue  normal saline 125 cc with potassium supplementation.  She received 2 L normal saline boluses in the ED.  Patient concerned about recurrent sepsis requiring hospitalization in the past.  Requesting for ID consult/evaluation (??  Immunodeficiency syndrome at baseline??  HIV negative.    2/10:  Cont vanc. Zosyn switched to rocephin by ID  Cultures no growth to date  Respiratory viral panel being sent  CT chest abd and pelvis ordered by ID   Immunodeficiency workup started     2/11:  Leukocytosis resolved  Currently on po augmentin  Cont to monitor  Dc in am if stable   Will need to follow up with ID outpatient for result of autoimmune/immunodeficiency workup    Iron deficiency anemia  2/10:  Was given venofer 200mg x1  Ferrous sulfate started  Patient reports not using her birthcontrol patch properly  Which is likely the cause of her frequent and heavy menstrual cycles  Currently does not have any vaginal bleeding per report  Will transfuse 1 unit prbc  Denies any melena or BRBPR  Monitor H/H  Recommended outpatient follow up with obgyn    2/11:  Continue PO iron therapy  Recommended continuing taking twice daily with orange juice once discharged  Counseled on dietary modifications to increase iron in diet   Will need to follow up with OBGYN outpatient  Plan to dc with script for birth control patches       Hypotension  Latest blood pressure 92/55, in the setting of sepsis.  Lactic acid 1.4  Young female, continue aggressive IV fluid hydration.  Unclear etiology of patient's infection.    2/10:  IVF  Currently receiving blood which should help with BP  Cont to monitor  Patient states she tends to have low BP at baseline     Acute viral syndrome  Negative for influenza and HIV  But patient's mother had cough, congestion (URI type symptoms)  Likely etiology for patient sepsis.  Continue supportive care  Bronchodilators, IV fluids, antitussives    2/10:  Respiratory viral panel sent  Continue symptomatic treatment        VTE Risk Mitigation (From admission, onward)         Ordered     Place sequential compression device  Until discontinued      02/09/20 0559                      Bernard Fink MD  Department of Hospital Medicine   [facility]

## 2020-02-11 NOTE — PROGRESS NOTES
Clinical Pharmacy: Vancomycin Consult -- Signoff Note    Therapy with vancomycin complete and/or consult discontinued by provider.  We will switch to oral antibiotics.  Pharmacy will sign off, please re-consult as needed.    Thank you for allowing us to participate in this patient's care.   Katherine McArdle, Pharm.D. 2/11/2020 10:55 AM

## 2020-02-11 NOTE — ASSESSMENT & PLAN NOTE
2/10:  Was given venofer 200mg x1  Ferrous sulfate started  Patient reports not using her birthcontrol patch properly  Which is likely the cause of her frequent and heavy menstrual cycles  Currently does not have any vaginal bleeding per report  Will transfuse 1 unit prbc  Denies any melena or BRBPR  Monitor H/H  Recommended outpatient follow up with obgyn    2/11:  Continue PO iron therapy  Recommended continuing taking twice daily with orange juice once discharged  Counseled on dietary modifications to increase iron in diet   Will need to follow up with OBGYN outpatient  Plan to dc with script for birth control patches

## 2020-02-11 NOTE — SUBJECTIVE & OBJECTIVE
Interval History: Reports sob overnight. Was started on lasix once. Symptoms improved. Denies any other issues.     Review of Systems   Constitutional: Positive for appetite change, fatigue and fever.   Respiratory: Negative for shortness of breath.    Cardiovascular: Negative for chest pain.   Gastrointestinal: Negative for nausea and vomiting.   Skin: Negative for rash.   Neurological: Positive for weakness.   Psychiatric/Behavioral: The patient is nervous/anxious.      Objective:     Vital Signs (Most Recent):  Temp: 98.3 °F (36.8 °C) (02/11/20 1539)  Pulse: 88 (02/11/20 1539)  Resp: 16 (02/11/20 1539)  BP: 105/64 (02/11/20 1539)  SpO2: 98 % (02/11/20 1539) Vital Signs (24h Range):  Temp:  [98.1 °F (36.7 °C)-99.6 °F (37.6 °C)] 98.3 °F (36.8 °C)  Pulse:  [] 88  Resp:  [16-18] 16  SpO2:  [95 %-100 %] 98 %  BP: ()/(51-81) 105/64     Weight: 45 kg (99 lb 3.3 oz)  Body mass index is 18.15 kg/m².    Intake/Output Summary (Last 24 hours) at 2/11/2020 1612  Last data filed at 2/11/2020 1500  Gross per 24 hour   Intake 2653 ml   Output 2150 ml   Net 503 ml      Physical Exam   Constitutional: She is oriented to person, place, and time. She appears well-developed and well-nourished. No distress.   HENT:   Head: Normocephalic and atraumatic.   Eyes:   Pale conjunctivae   Cardiovascular: Normal rate, regular rhythm and normal heart sounds. Exam reveals no gallop and no friction rub.   No murmur heard.  Pulmonary/Chest: Effort normal and breath sounds normal. No stridor. No respiratory distress. She has no wheezes. She has no rales.   Abdominal: Soft. Bowel sounds are normal. She exhibits no distension and no mass. There is no tenderness. There is no guarding.   Musculoskeletal: Normal range of motion. She exhibits no edema.   Neurological: She is alert and oriented to person, place, and time.   Skin: Skin is warm. She is not diaphoretic. No erythema.   Psychiatric:   anxious       Significant Labs:   Recent Lab  Results       02/11/20  0839   02/11/20  0830   02/10/20  1759        Anion Gap 8         Baso #   0.04       Basophil%   0.4       BUN, Bld 3         Calcium 9.3         Chloride 108         CO2 26         Creatinine 0.8         Differential Method   Automated       eGFR if  >60         eGFR if non  >60  Comment:  Calculation used to obtain the estimated glomerular filtration  rate (eGFR) is the CKD-EPI equation.            Eos #   0.4       Eosinophil%   4.0       Glucose 105         Gran # (ANC)   6.9       Gran%   70.2       Hematocrit   33.4 30.1     Hemoglobin   9.5 8.7     Immature Grans (Abs)   0.02  Comment:  Mild elevation in immature granulocytes is non specific and   can be seen in a variety of conditions including stress response,   acute inflammation, trauma and pregnancy. Correlation with other   laboratory and clinical findings is essential.         Immature Granulocytes   0.2       Lymph #   1.6       Lymph%   16.2       Magnesium 1.6         MCH   21.8       MCHC   28.4       MCV   77       Mono #   0.9       Mono%   9.0       MPV   9.3       nRBC   0       Phosphorus 3.2         Platelets   290       Potassium 3.7         RBC   4.35       RDW   16.7       Sodium 142         WBC   9.88           All pertinent labs within the past 24 hours have been reviewed.    Significant Imaging: I have reviewed all pertinent imaging results/findings within the past 24 hours.

## 2020-02-11 NOTE — PLAN OF CARE
CM met with patient at the bedside to discuss the discharge plan.  Patient declines any discharge needs at this time.       02/11/20 0926   Discharge Reassessment   Assessment Type Discharge Planning Reassessment   Provided patient/caregiver education on the expected discharge date and the discharge plan Yes   Do you have any problems affording any of your prescribed medications? No   Discharge Plan A Home   DME Needed Upon Discharge  none   Patient choice form signed by patient/caregiver N/A   Anticipated Discharge Disposition Home   Can the patient/caregiver answer the patient profile reliably? Yes, cognitively intact

## 2020-02-11 NOTE — ASSESSMENT & PLAN NOTE
Fever 102.9, , WBC 74214, 0% bands, lactic acid 1.4.  Unclear etiology of patient's infection, but strongly suspect acute viral syndrome.  ED physician was concerned about tampon induced staphylococcal toxic shock syndrome, though unlikely/rare.  On IV vancomycin, IV Zosyn empirically.  Deescalate antibiotics as soon as feasible.  Follow up on cultures.  Continue normal saline 125 cc with potassium supplementation.  She received 2 L normal saline boluses in the ED.  Patient concerned about recurrent sepsis requiring hospitalization in the past.  Requesting for ID consult/evaluation (??  Immunodeficiency syndrome at baseline??  HIV negative.    2/10:  Cont vanc. Zosyn switched to rocephin by ID  Cultures no growth to date  Respiratory viral panel being sent  CT chest abd and pelvis ordered by ID   Immunodeficiency workup started     2/11:  Leukocytosis resolved  Currently on po augmentin  Cont to monitor  Dc in am if stable   Will need to follow up with ID outpatient for result of autoimmune/immunodeficiency workup

## 2020-02-11 NOTE — ASSESSMENT & PLAN NOTE
Source is unclear -UA and chest x-ray are clear.  Will send viral respiratory panel, will follow blood cultures.  Will stop Vanco/zosyn and follow fever curve.  Will do cardiac echo.  Send immunodeficency work up    2/10- will continue vanco/rocephin -  Will follow cbc in AM .  2/11-will switch to PO Augmentin .  Will need 7 more days of therapy

## 2020-02-12 VITALS
TEMPERATURE: 98 F | DIASTOLIC BLOOD PRESSURE: 67 MMHG | HEIGHT: 62 IN | WEIGHT: 97.69 LBS | BODY MASS INDEX: 17.98 KG/M2 | OXYGEN SATURATION: 98 % | HEART RATE: 84 BPM | RESPIRATION RATE: 16 BRPM | SYSTOLIC BLOOD PRESSURE: 114 MMHG

## 2020-02-12 PROBLEM — A41.9 SEPSIS: Status: RESOLVED | Noted: 2020-02-09 | Resolved: 2020-02-12

## 2020-02-12 PROBLEM — I95.9 HYPOTENSION: Status: RESOLVED | Noted: 2020-02-09 | Resolved: 2020-02-12

## 2020-02-12 LAB
ALBUMIN SERPL BCP-MCNC: 2.9 G/DL (ref 3.5–5.2)
ALP SERPL-CCNC: 48 U/L (ref 55–135)
ALT SERPL W/O P-5'-P-CCNC: 14 U/L (ref 10–44)
ANION GAP SERPL CALC-SCNC: 10 MMOL/L (ref 8–16)
AST SERPL-CCNC: 12 U/L (ref 10–40)
BILIRUB SERPL-MCNC: 0.3 MG/DL (ref 0.1–1)
BUN SERPL-MCNC: 6 MG/DL (ref 6–20)
CALCIUM SERPL-MCNC: 8.7 MG/DL (ref 8.7–10.5)
CHLORIDE SERPL-SCNC: 108 MMOL/L (ref 95–110)
CO2 SERPL-SCNC: 26 MMOL/L (ref 23–29)
CREAT SERPL-MCNC: 0.6 MG/DL (ref 0.5–1.4)
EST. GFR  (AFRICAN AMERICAN): >60 ML/MIN/1.73 M^2
EST. GFR  (NON AFRICAN AMERICAN): >60 ML/MIN/1.73 M^2
GLUCOSE SERPL-MCNC: 87 MG/DL (ref 70–110)
POTASSIUM SERPL-SCNC: 3.7 MMOL/L (ref 3.5–5.1)
PROT SERPL-MCNC: 5.9 G/DL (ref 6–8.4)
SODIUM SERPL-SCNC: 144 MMOL/L (ref 136–145)

## 2020-02-12 PROCEDURE — 80053 COMPREHEN METABOLIC PANEL: CPT

## 2020-02-12 PROCEDURE — 25000003 PHARM REV CODE 250: Performed by: FAMILY MEDICINE

## 2020-02-12 PROCEDURE — 36415 COLL VENOUS BLD VENIPUNCTURE: CPT

## 2020-02-12 RX ORDER — NORELGESTROMIN AND ETHINYL ESTRADIOL 35; 150 UG/MG; UG/MG
1 PATCH TRANSDERMAL WEEKLY
Qty: 3 PATCH | Refills: 1 | Status: SHIPPED | OUTPATIENT
Start: 2020-02-12 | End: 2023-06-29

## 2020-02-12 RX ORDER — AMOXICILLIN AND CLAVULANATE POTASSIUM 400; 57 MG/5ML; MG/5ML
875 POWDER, FOR SUSPENSION ORAL EVERY 12 HOURS
Qty: 200 ML | Refills: 0 | Status: SHIPPED | OUTPATIENT
Start: 2020-02-12 | End: 2020-02-21

## 2020-02-12 RX ORDER — FERROUS SULFATE 325(65) MG
325 TABLET, DELAYED RELEASE (ENTERIC COATED) ORAL 2 TIMES DAILY
Qty: 60 TABLET | Refills: 1 | Status: SHIPPED | OUTPATIENT
Start: 2020-02-12 | End: 2020-04-12

## 2020-02-12 RX ADMIN — AMOXICILLIN AND CLAVULANATE POTASSIUM 875.2 MG: 400; 57 POWDER, FOR SUSPENSION ORAL at 09:02

## 2020-02-12 NOTE — SUBJECTIVE & OBJECTIVE
Interval History:   19 year old woman with sepsis syndrome .  Serum procalcitonin is elevated.  CT scan of the chest -Patchy infiltrates are seen within the lower lobes with interlobular septal thickening which could reflect interstitial pneumonia or mild edema.  Trace pleural effusions.  Periportal edema is noted which can be seen with aggressive hydration or acute hepatitis.  Gallbladder wall thickening is noted which also can be seen with aggressive hydration or intrinsic liver disease.   She has worsening dyspnoea.  02/11/2020- she feels better . She is afebrile.  2/11- she feels better   Review of Systems   Constitutional: Negative for chills, diaphoresis, fatigue and fever.   HENT: Negative.  Negative for congestion, nosebleeds and sinus pressure.    Eyes: Negative.  Negative for visual disturbance.   Respiratory: Negative.  Negative for cough, chest tightness, shortness of breath and wheezing.    Cardiovascular: Negative.  Negative for chest pain, palpitations and leg swelling.   Gastrointestinal: Negative for abdominal pain, diarrhea (-), nausea and vomiting.   Endocrine: Negative.  Negative for polyuria.   Genitourinary: Negative.  Negative for dysuria, flank pain, frequency and urgency.   Musculoskeletal: Positive for arthralgias. Negative for back pain, joint swelling and neck stiffness.   Skin: Negative.  Negative for color change, pallor and rash.   Allergic/Immunologic: Negative.  Negative for immunocompromised state.   Neurological: Negative for dizziness, syncope, speech difficulty, numbness and headaches.   Hematological: Negative.  Negative for adenopathy. Does not bruise/bleed easily.   Psychiatric/Behavioral: Negative.  Negative for confusion, decreased concentration and hallucinations. The patient is not nervous/anxious.    All other systems reviewed and are negative.    Objective:     Vital Signs (Most Recent):  Temp: 97.7 °F (36.5 °C) (02/12/20 0419)  Pulse: 97 (02/12/20 0419)  Resp: 18  (02/12/20 0419)  BP: (!) 97/53 (02/12/20 0419)  SpO2: 99 % (02/12/20 0419) Vital Signs (24h Range):  Temp:  [97.7 °F (36.5 °C)-98.7 °F (37.1 °C)] 97.7 °F (36.5 °C)  Pulse:  [] 97  Resp:  [16-18] 18  SpO2:  [95 %-100 %] 99 %  BP: ()/(52-64) 97/53     Weight: 44.3 kg (97 lb 10.6 oz)  Body mass index is 17.86 kg/m².    Estimated Creatinine Clearance: 79.1 mL/min (based on SCr of 0.8 mg/dL).    Physical Exam   Constitutional: She is oriented to person, place, and time. No distress.   -   HENT:   Head: Normocephalic and atraumatic.   Eyes: Conjunctivae and EOM are normal. No scleral icterus.   Neck: Normal range of motion. Neck supple. No tracheal deviation present. No thyromegaly present.   Cardiovascular: Regular rhythm, normal heart sounds and intact distal pulses. Tachycardia present.   No murmur heard.  Pulmonary/Chest: Effort normal and breath sounds normal. No respiratory distress. She has no wheezes. She has no rales. She exhibits no tenderness.   Abdominal: Soft. Bowel sounds are normal. She exhibits no distension. There is no tenderness.   Musculoskeletal: Normal range of motion. She exhibits no edema or tenderness.   Neurological: She is alert and oriented to person, place, and time. No cranial nerve deficit. She exhibits normal muscle tone. Coordination normal.   Skin: Skin is warm and dry. She is not diaphoretic. No erythema.   Psychiatric: She has a normal mood and affect. Her behavior is normal. Judgment and thought content normal.   Nursing note and vitals reviewed.      Significant Labs:   BMP:   Recent Labs   Lab 02/11/20  0839         K 3.7      CO2 26   BUN 3*   CREATININE 0.8   CALCIUM 9.3   MG 1.6     CBC:   Recent Labs   Lab 02/10/20  1759 02/11/20  0830   WBC  --  9.88   HGB 8.7* 9.5*   HCT 30.1* 33.4*   PLT  --  290     All pertinent labs within the past 24 hours have been reviewed.    Significant Imaging: I have reviewed all pertinent imaging results/findings  within the past 24 hours.

## 2020-02-12 NOTE — PLAN OF CARE
CM made several attempts to schedule hospital follow up appointment at [facility] @[phone].  No answer and unable to leave a message

## 2020-02-12 NOTE — ASSESSMENT & PLAN NOTE
Source is unclear -UA and chest x-ray are clear.  Will send viral respiratory panel, will follow blood cultures.  Will stop Vanco/zosyn and follow fever curve.  Will do cardiac echo.  Send immunodeficency work up    2/10- will continue vanco/rocephin -  Will follow cbc in AM .  2/11-will switch to PO Augmentin .  Will need 7 more days of therapy     2/12- ok to discharge from ID perspective , complete 6 more days of Augmentin

## 2020-02-12 NOTE — PLAN OF CARE
POC reviewed with pt. Verbalized understanding. pt is on tele monitor see shift rounds. No other c/o at this time, PIV intact. Call bell and personal belongings within reach. Hourly rounding completed. Instructed pt to call for assistance as needed. Will continue to monitor.

## 2020-02-12 NOTE — PLAN OF CARE
02/12/20 1433   Final Note   Assessment Type Final Discharge Note   Anticipated Discharge Disposition Home   Right Care Referral Info   Post Acute Recommendation No Care

## 2020-02-12 NOTE — PROGRESS NOTES
[facility]  Infectious Disease  Progress Note    Patient Name: Danielle Brown  MRN: [id number]  Admission Date: 2/9/2020  Length of Stay: 2 days  Attending Physician: Bernard Fink MD  Primary Care Provider: Primary Doctor No    Isolation Status: No active isolations  Assessment/Plan:      * Sepsis  Source is unclear -UA and chest x-ray are clear.  Will send viral respiratory panel, will follow blood cultures.  Will stop Vanco/zosyn and follow fever curve.  Will do cardiac echo.  Send immunodeficency work up    2/10- will continue vanco/rocephin -  Will follow cbc in AM .  2/11-will switch to PO Augmentin .  Will need 7 more days of therapy     2/12- ok to discharge from ID perspective , complete 6 more days of Augmentin         Anticipated Disposition:     Thank you for your consult. I will follow-up with patient. Please contact us if you have any additional questions.    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 She denies any history of travel  but was recently in contact with her mum who came from [place]. She had prior history of sepsis -in 2018 and at that time , sepsis was suspected to be due to UTI. Initial Ed vitals showed - Fever 102.9, ,. CBC showed -WBC 71170.  Urinalysis unremarkable.  She reports that she since that last admission, she has recurrent almost bimonthly infections that are affecting her studies.  Ct scan -of the abdomen  12/2019- Several tiny ill-defined alveolar to somewhat nodular densities as well as some dependent subpleural atelectasis or airspace changes in both lung bases, new since previous..  Chest x-ray since admission- clear .  Respiratory  -viral panel -12/2019- negative.  No other person in the family is sick at this time. Her boyfriend is not ill too .           18-year-old  female with history of nephrolithiasis, multiple UTIs who initially presented to the emergency department on 10/12 with high fevers, nausea, vomiting and chills. Patient was noted to have a fever of 105 as well as hypotension and leukocytosis in the emergency department. She was given generous IV fluids and treated with broad-spectrum IV antibiotic coverage. All workup initially was unrevealing other than a possible UTI. Blood cultures and throat cultures and respiratory viral panel will also head. These were all unrevealing. CT abdomen pelvis was also performed for completeness which only showed nephrocalcinosis with no definite urinary calculus or obstruction. Nonetheless, patient clinically improved and her WBC trended down from 17,000-5.4 prior to discharge. She remains afebrile at this time and all culture data is negative. I likely suspect she had a partially treated UTI as she was recently on amoxicillin. Therefore, we will continue treatment for her UTI with 3 additional days of p.o. Omnicef to complete a 7-day course. She is also developed some diarrhea from the broad-spectrum IV antibiotics and therefore will prescribe her a 7-day course of Flagyl to combat any colitis. She will also be discharged home with daily probiotics. She will have close PCP follow-up on outpatient basis. Have instructed the patient to return to the emergency     Interval History:   19 year old woman with sepsis syndrome .  Serum procalcitonin is elevated.  CT scan of the chest -Patchy infiltrates are seen within the lower lobes with interlobular septal thickening which could reflect interstitial pneumonia or mild edema.  Trace pleural effusions.  Periportal edema is noted which can be seen with aggressive hydration or acute hepatitis.  Gallbladder wall thickening is noted which also can be seen with aggressive hydration or intrinsic liver disease.   She has worsening dyspnoea.  02/11/2020- she feels better . She is afebrile.  2/11-  she feels better   Review of Systems   Constitutional: Negative for chills, diaphoresis, fatigue and fever.   HENT: Negative.  Negative for congestion, nosebleeds and sinus pressure.    Eyes: Negative.  Negative for visual disturbance.   Respiratory: Negative.  Negative for cough, chest tightness, shortness of breath and wheezing.    Cardiovascular: Negative.  Negative for chest pain, palpitations and leg swelling.   Gastrointestinal: Negative for abdominal pain, diarrhea (-), nausea and vomiting.   Endocrine: Negative.  Negative for polyuria.   Genitourinary: Negative.  Negative for dysuria, flank pain, frequency and urgency.   Musculoskeletal: Positive for arthralgias. Negative for back pain, joint swelling and neck stiffness.   Skin: Negative.  Negative for color change, pallor and rash.   Allergic/Immunologic: Negative.  Negative for immunocompromised state.   Neurological: Negative for dizziness, syncope, speech difficulty, numbness and headaches.   Hematological: Negative.  Negative for adenopathy. Does not bruise/bleed easily.   Psychiatric/Behavioral: Negative.  Negative for confusion, decreased concentration and hallucinations. The patient is not nervous/anxious.    All other systems reviewed and are negative.    Objective:     Vital Signs (Most Recent):  Temp: 97.7 °F (36.5 °C) (02/12/20 0419)  Pulse: 97 (02/12/20 0419)  Resp: 18 (02/12/20 0419)  BP: (!) 97/53 (02/12/20 0419)  SpO2: 99 % (02/12/20 0419) Vital Signs (24h Range):  Temp:  [97.7 °F (36.5 °C)-98.7 °F (37.1 °C)] 97.7 °F (36.5 °C)  Pulse:  [] 97  Resp:  [16-18] 18  SpO2:  [95 %-100 %] 99 %  BP: ()/(52-64) 97/53     Weight: 44.3 kg (97 lb 10.6 oz)  Body mass index is 17.86 kg/m².    Estimated Creatinine Clearance: 79.1 mL/min (based on SCr of 0.8 mg/dL).    Physical Exam   Constitutional: She is oriented to person, place, and time. No distress.   -   HENT:   Head: Normocephalic and atraumatic.   Eyes: Conjunctivae and EOM are normal.  No scleral icterus.   Neck: Normal range of motion. Neck supple. No tracheal deviation present. No thyromegaly present.   Cardiovascular: Regular rhythm, normal heart sounds and intact distal pulses. Tachycardia present.   No murmur heard.  Pulmonary/Chest: Effort normal and breath sounds normal. No respiratory distress. She has no wheezes. She has no rales. She exhibits no tenderness.   Abdominal: Soft. Bowel sounds are normal. She exhibits no distension. There is no tenderness.   Musculoskeletal: Normal range of motion. She exhibits no edema or tenderness.   Neurological: She is alert and oriented to person, place, and time. No cranial nerve deficit. She exhibits normal muscle tone. Coordination normal.   Skin: Skin is warm and dry. She is not diaphoretic. No erythema.   Psychiatric: She has a normal mood and affect. Her behavior is normal. Judgment and thought content normal.   Nursing note and vitals reviewed.      Significant Labs:   BMP:   Recent Labs   Lab 02/11/20  0839         K 3.7      CO2 26   BUN 3*   CREATININE 0.8   CALCIUM 9.3   MG 1.6     CBC:   Recent Labs   Lab 02/10/20  1759 02/11/20  0830   WBC  --  9.88   HGB 8.7* 9.5*   HCT 30.1* 33.4*   PLT  --  290     All pertinent labs within the past 24 hours have been reviewed.    Significant Imaging: I have reviewed all pertinent imaging results/findings within the past 24 hours.

## 2020-02-13 LAB
ENTEROVIRUS: NOT DETECTED
HETEROPH AB SER QL LA: NEGATIVE
HUMAN BOCAVIRUS: NOT DETECTED
HUMAN CORONAVIRUS, COMMON COLD VIRUS: NOT DETECTED
INFLUENZA A - H1N1-09: NOT DETECTED
PARAINFLUENZA: NOT DETECTED
RVP - ADENOVIRUS: NOT DETECTED
RVP - HUMAN METAPNEUMOVIRUS (HMPV): NOT DETECTED
RVP - INFLUENZA A: NOT DETECTED
RVP - INFLUENZA B: NOT DETECTED
RVP - RESPIRATORY SYNCTIAL VIRUS (RSV) A: NOT DETECTED
RVP - RESPIRATORY VIRAL PANEL, SOURCE: NORMAL
RVP - RHINOVIRUS: NOT DETECTED

## 2020-02-13 NOTE — DISCHARGE SUMMARY
[facility] Hospital Medicine  Discharge Summary      Patient Name: Danielle Brown  MRN: [id number]  Admission Date: 2/9/2020  Hospital Length of Stay: 2 days  Discharge Date and Time: 2/12/2020 12:47 PM  Attending Physician: No att. providers found   Discharging Provider: Bernard Fink MD  Primary Care Provider: THAD Calhoun      HPI:   Ms. Brown is a young 19-year-old  female, currently a student in nursing, with history of septic episode in October 2018, presented to the ED complaining of fever, nausea, vomiting, diarrhea since early yesterday morning.  She states that her mother who just arrived from [place] yesterday has been having cold, cough symptoms and patient immediately started having similar symptoms.  Patient did not have the flu shot this year, but she tested negative for influenza in the ED.  Fever 102.9, , WBC 27570, 0% bands, lactic acid 1.4.  Urinalysis unremarkable.  Chest x-ray without infiltrates, masses or effusions.  No clear source of infection however patient reports that due to heavy menstrual periods she overuses tampons frequently.  But denies lower abdominal discomfort.  She just removed the tampon in the ED after 12 hr.  Patient has not made an appointment to see an OBGYN yet for heavy menstrual periods.  Per ED physician, patient looked toxic upon initial presentation to the ED, but after 2 L of IV fluids she is less toxic appearing. There was a concern of tampon induced staphylococcal toxic shock syndrome and hence she was started on IV vancomycin, along with IV Zosyn.    Admit diagnosis sepsis likely secondary to acute viral syndrome versus others.    * No surgery found *      Hospital Course:   Patient was admitted for sepsis 2/2 to viral etiology and pna. ID was consulted on case as patient reported recurrent infections. Autoimmune and immunodeficiency workup initiated. Patient was started on broad spectrum abx and dced on augmentin. CT chest abd and  pelvis showed patchy infiltrates which could represent pna. Patient was also found to have microcytic anemia likely 2/2 to menometrorrhagia due to inconsistent birth control use. She was counseled to use birth control patches as directed. She was transfused 1 unit of prbc. Labs show SHANIQUA. Venofer was given once along with ferrous sulfate starting bid. Patient was discharge home with instructions to follow up with ID outpatient.          Consults:     No new Assessment & Plan notes have been filed under this hospital service since the last note was generated.  Service: Hospital Medicine    Final Active Diagnoses:    Diagnosis Date Noted POA    Iron deficiency anemia [D50.9] 02/10/2020 Yes    Acute viral syndrome [B34.9] 02/09/2020 Yes      Problems Resolved During this Admission:    Diagnosis Date Noted Date Resolved POA    PRINCIPAL PROBLEM:  Sepsis [A41.9] 02/09/2020 02/12/2020 Yes    Hypokalemia [E87.6] 02/09/2020 02/10/2020 Yes    Hypotension [I95.9] 02/09/2020 02/12/2020 Yes       Discharged Condition: good    Disposition: Home or Self Care    Follow Up:  Follow-up Information     John Harkins MD. Schedule an appointment as soon as possible for a visit in 2 weeks.    Specialty:  Infectious Diseases  Contact information:  [address] [place] [id number] [phone]             Primary Doctor No In 1 week.           THAD Calhoun. Schedule an appointment as soon as possible for a visit in 1 week.    Specialty:  Family Medicine  Why:  We were unable to make an appointment with your provider.  Please call and schedule your follow up.  Thanks  Contact information:  [id number] [place] [address] [phone]                 Patient Instructions:      Diet Adult Regular       Significant Diagnostic Studies:   Results for orders placed or performed during the hospital encounter of 02/09/20   Blood culture x two cultures. Draw prior to antibiotics.   Result Value Ref  Range    Blood Culture, Routine No Growth to date     Blood Culture, Routine No Growth to date     Blood Culture, Routine No Growth to date     Blood Culture, Routine No Growth to date    Blood culture x two cultures. Draw prior to antibiotics.   Result Value Ref Range    Blood Culture, Routine No Growth to date     Blood Culture, Routine No Growth to date     Blood Culture, Routine No Growth to date     Blood Culture, Routine No Growth to date    Influenza A & B by Molecular   Result Value Ref Range    Influenza A, Molecular Negative Negative    Influenza B, Molecular Negative Negative    Flu A & B Source Nasal swab    CBC auto differential   Result Value Ref Range    WBC 10.30 3.90 - 12.70 K/uL    RBC 4.38 4.00 - 5.40 M/uL    Hemoglobin 9.1 (L) 12.0 - 16.0 g/dL    Hematocrit 32.7 (L) 37.0 - 48.5 %    Mean Corpuscular Volume 75 (L) 82 - 98 fL    Mean Corpuscular Hemoglobin 20.8 (L) 27.0 - 31.0 pg    Mean Corpuscular Hemoglobin Conc 27.8 (L) 32.0 - 36.0 g/dL    RDW 15.7 (H) 11.5 - 14.5 %    Platelets 302 150 - 350 K/uL    MPV 8.9 (L) 9.2 - 12.9 fL    Immature Granulocytes 0.4 0.0 - 0.5 %    Gran # (ANC) 9.4 (H) 1.8 - 7.7 K/uL    Immature Grans (Abs) 0.04 0.00 - 0.04 K/uL    Lymph # 0.3 (L) 1.0 - 4.8 K/uL    Mono # 0.6 0.3 - 1.0 K/uL    Eos # 0.0 0.0 - 0.5 K/uL    Baso # 0.02 0.00 - 0.20 K/uL    nRBC 0 0 /100 WBC    Gran% 90.9 (H) 38.0 - 73.0 %    Lymph% 2.4 (L) 18.0 - 48.0 %    Mono% 5.8 4.0 - 15.0 %    Eosinophil% 0.3 0.0 - 8.0 %    Basophil% 0.2 0.0 - 1.9 %    Differential Method Automated    Comprehensive metabolic panel   Result Value Ref Range    Sodium 141 136 - 145 mmol/L    Potassium 3.4 (L) 3.5 - 5.1 mmol/L    Chloride 108 95 - 110 mmol/L    CO2 22 (L) 23 - 29 mmol/L    Glucose 116 (H) 70 - 110 mg/dL    BUN, Bld 13 6 - 20 mg/dL    Creatinine 0.8 0.5 - 1.4 mg/dL    Calcium 9.0 8.7 - 10.5 mg/dL    Total Protein 7.2 6.0 - 8.4 g/dL    Albumin 3.8 3.5 - 5.2 g/dL    Total Bilirubin 0.6 0.1 - 1.0 mg/dL    Alkaline  Phosphatase 52 (L) 55 - 135 U/L    AST 18 10 - 40 U/L    ALT 13 10 - 44 U/L    Anion Gap 11 8 - 16 mmol/L    eGFR if African American >60 >60 mL/min/1.73 m^2    eGFR if non African American >60 >60 mL/min/1.73 m^2   Lactic acid, plasma #1   Result Value Ref Range    Lactate (Lactic Acid) 1.4 0.5 - 2.2 mmol/L   Urinalysis, Reflex to Urine Culture Urine, Clean Catch   Result Value Ref Range    Specimen UA Urine, Clean Catch     Color, UA Yellow Yellow, Straw, Conchita    Appearance, UA Clear Clear    pH, UA 6.0 5.0 - 8.0    Specific Gravity, UA 1.020 1.005 - 1.030    Protein, UA Negative Negative    Glucose, UA Negative Negative    Ketones, UA Negative Negative    Bilirubin (UA) Negative Negative    Occult Blood UA Trace (A) Negative    Nitrite, UA Negative Negative    Urobilinogen, UA Negative <2.0 EU/dL    Leukocytes, UA Negative Negative   HIV 1/2 Ag/Ab (4th Gen)   Result Value Ref Range    HIV 1/2 Ag/Ab Negative Negative   Iron and TIBC   Result Value Ref Range    Iron 12 (L) 30 - 160 ug/dL    Transferrin 282 200 - 375 mg/dL    TIBC 417 250 - 450 ug/dL    Saturated Iron 3 (L) 20 - 50 %   Ferritin   Result Value Ref Range    Ferritin 10 (L) 20.0 - 300.0 ng/mL   C-reactive protein   Result Value Ref Range    .2 (H) 0.0 - 8.2 mg/L   Magnesium   Result Value Ref Range    Magnesium 1.4 (L) 1.6 - 2.6 mg/dL   Phosphorus   Result Value Ref Range    Phosphorus 2.2 (L) 2.7 - 4.5 mg/dL   CBC auto differential   Result Value Ref Range    WBC 13.48 (H) 3.90 - 12.70 K/uL    RBC 3.30 (L) 4.00 - 5.40 M/uL    Hemoglobin 6.9 (L) 12.0 - 16.0 g/dL    Hematocrit 27.4 (L) 37.0 - 48.5 %    Mean Corpuscular Volume 83 82 - 98 fL    Mean Corpuscular Hemoglobin 20.9 (L) 27.0 - 31.0 pg    Mean Corpuscular Hemoglobin Conc 25.2 (L) 32.0 - 36.0 g/dL    RDW 16.2 (H) 11.5 - 14.5 %    Platelets 239 150 - 350 K/uL    MPV 8.9 (L) 9.2 - 12.9 fL    Immature Granulocytes 0.6 (H) 0.0 - 0.5 %    Gran # (ANC) 11.8 (H) 1.8 - 7.7 K/uL    Immature Grans  (Abs) 0.08 (H) 0.00 - 0.04 K/uL    Lymph # 0.8 (L) 1.0 - 4.8 K/uL    Mono # 0.7 0.3 - 1.0 K/uL    Eos # 0.1 0.0 - 0.5 K/uL    Baso # 0.05 0.00 - 0.20 K/uL    nRBC 0 0 /100 WBC    Gran% 87.6 (H) 38.0 - 73.0 %    Lymph% 5.9 (L) 18.0 - 48.0 %    Mono% 4.8 4.0 - 15.0 %    Eosinophil% 0.7 0.0 - 8.0 %    Basophil% 0.4 0.0 - 1.9 %    Differential Method Automated    Comprehensive metabolic panel   Result Value Ref Range    Sodium 138 136 - 145 mmol/L    Potassium 3.5 3.5 - 5.1 mmol/L    Chloride 113 (H) 95 - 110 mmol/L    CO2 19 (L) 23 - 29 mmol/L    Glucose 103 70 - 110 mg/dL    BUN, Bld 7 6 - 20 mg/dL    Creatinine 0.8 0.5 - 1.4 mg/dL    Calcium 7.7 (L) 8.7 - 10.5 mg/dL    Total Protein 5.1 (L) 6.0 - 8.4 g/dL    Albumin 2.5 (L) 3.5 - 5.2 g/dL    Total Bilirubin 0.5 0.1 - 1.0 mg/dL    Alkaline Phosphatase 33 (L) 55 - 135 U/L    AST 18 10 - 40 U/L    ALT 13 10 - 44 U/L    Anion Gap 6 (L) 8 - 16 mmol/L    eGFR if African American >60 >60 mL/min/1.73 m^2    eGFR if non African American >60 >60 mL/min/1.73 m^2   Immunoglobulins (IgG, IgA, IgM) Quantitative   Result Value Ref Range    IgG - Serum 763 650 - 1600 mg/dL    IgA 161 40 - 350 mg/dL    IgM 83 50 - 300 mg/dL   Procalcitonin   Result Value Ref Range    Procalcitonin 3.01 (H) <0.25 ng/mL   Rheumatoid factor   Result Value Ref Range    Rheumatoid Factor 21.0 (H) 0.0 - 15.0 IU/mL   DILEEP   Result Value Ref Range    DILEEP Screen Negative <1:80 Negative <1:80   Sedimentation rate   Result Value Ref Range    Sed Rate 13 0 - 20 mm/Hr   Vancomycin, random   Result Value Ref Range    Vancomycin, Random 3.9 Not established ug/mL   Protime-INR   Result Value Ref Range    Prothrombin Time 13.1 (H) 9.0 - 12.5 sec    INR 1.2 0.8 - 1.2   APTT   Result Value Ref Range    aPTT 31.9 21.0 - 32.0 sec   Pregnancy, urine rapid   Result Value Ref Range    Preg Test, Ur Negative    Hemoglobin   Result Value Ref Range    Hemoglobin 8.7 (L) 12.0 - 16.0 g/dL   Hematocrit   Result Value Ref Range     Hematocrit 30.1 (L) 37.0 - 48.5 %   CBC auto differential   Result Value Ref Range    WBC 9.88 3.90 - 12.70 K/uL    RBC 4.35 4.00 - 5.40 M/uL    Hemoglobin 9.5 (L) 12.0 - 16.0 g/dL    Hematocrit 33.4 (L) 37.0 - 48.5 %    Mean Corpuscular Volume 77 (L) 82 - 98 fL    Mean Corpuscular Hemoglobin 21.8 (L) 27.0 - 31.0 pg    Mean Corpuscular Hemoglobin Conc 28.4 (L) 32.0 - 36.0 g/dL    RDW 16.7 (H) 11.5 - 14.5 %    Platelets 290 150 - 350 K/uL    MPV 9.3 9.2 - 12.9 fL    Immature Granulocytes 0.2 0.0 - 0.5 %    Gran # (ANC) 6.9 1.8 - 7.7 K/uL    Immature Grans (Abs) 0.02 0.00 - 0.04 K/uL    Lymph # 1.6 1.0 - 4.8 K/uL    Mono # 0.9 0.3 - 1.0 K/uL    Eos # 0.4 0.0 - 0.5 K/uL    Baso # 0.04 0.00 - 0.20 K/uL    nRBC 0 0 /100 WBC    Gran% 70.2 38.0 - 73.0 %    Lymph% 16.2 (L) 18.0 - 48.0 %    Mono% 9.0 4.0 - 15.0 %    Eosinophil% 4.0 0.0 - 8.0 %    Basophil% 0.4 0.0 - 1.9 %    Differential Method Automated    Basic metabolic panel   Result Value Ref Range    Sodium 142 136 - 145 mmol/L    Potassium 3.7 3.5 - 5.1 mmol/L    Chloride 108 95 - 110 mmol/L    CO2 26 23 - 29 mmol/L    Glucose 105 70 - 110 mg/dL    BUN, Bld 3 (L) 6 - 20 mg/dL    Creatinine 0.8 0.5 - 1.4 mg/dL    Calcium 9.3 8.7 - 10.5 mg/dL    Anion Gap 8 8 - 16 mmol/L    eGFR if African American >60 >60 mL/min/1.73 m^2    eGFR if non African American >60 >60 mL/min/1.73 m^2   Magnesium   Result Value Ref Range    Magnesium 1.6 1.6 - 2.6 mg/dL   Phosphorus   Result Value Ref Range    Phosphorus 3.2 2.7 - 4.5 mg/dL   Comprehensive metabolic panel   Result Value Ref Range    Sodium 144 136 - 145 mmol/L    Potassium 3.7 3.5 - 5.1 mmol/L    Chloride 108 95 - 110 mmol/L    CO2 26 23 - 29 mmol/L    Glucose 87 70 - 110 mg/dL    BUN, Bld 6 6 - 20 mg/dL    Creatinine 0.6 0.5 - 1.4 mg/dL    Calcium 8.7 8.7 - 10.5 mg/dL    Total Protein 5.9 (L) 6.0 - 8.4 g/dL    Albumin 2.9 (L) 3.5 - 5.2 g/dL    Total Bilirubin 0.3 0.1 - 1.0 mg/dL    Alkaline Phosphatase 48 (L) 55 - 135 U/L     AST 12 10 - 40 U/L    ALT 14 10 - 44 U/L    Anion Gap 10 8 - 16 mmol/L    eGFR if African American >60 >60 mL/min/1.73 m^2    eGFR if non African American >60 >60 mL/min/1.73 m^2   Echo Color Flow Doppler? Yes   Result Value Ref Range    Echo EF Estimated 64 %    IVS 0.99 0.6 - 1.1 cm    LVIDD 4.33 3.5 - 6.0 cm    LVIDS 2.83 2.1 - 4.0 cm    LVOT diameter 1.89 cm    PW 0.90 0.6 - 1.1 cm    IVC ostium 0.90 cm    IVRT 59.98 ms    HR echo 93 1/minute    AV LVOT peak gradient 4 mmHg    LVOT peak gunner 0.99 m/s    LVOT peak VTI 18.95 cm    RVOT peak gunner 0.64 m/s    RVOT peak VTI 16.41 cm    LA size 3.15 cm    Left Atrium Major Axis 4.75 cm    Left Atrium Minor Axis 4.26 cm    RA Major Axis 3.58 cm    AV mean gradient 8 mmHg    Ao peak gunner 1.96 m/s    Ao VTI 34.68 cm    MV Peak A Gunner 0.63 m/s    E wave decelartion time 196.13 ms    MV Peak E Gunner 1.20 m/s    E/A ratio 1.90     Ascending aorta 2.37 cm    Proximal aorta 2.51 cm    BSA 1.44 m2    FS 35 28 - 44 %    LV mass 133.27 g    Left Ventricle Relative Wall Thickness 0.42 cm    AV valve area 1.53 cm2    AV Velocity Ratio 0.51     AV index (prosthetic) 0.55     LVOT area 2.8 cm2    LVOT stroke volume 53.14 cm3    AV peak gradient 15 mmHg    LV Mass Index 92 g/m2    Right Atrial Pressure (from IVC) 3 mmHg   Type & Screen   Result Value Ref Range    Group & Rh A POS     Indirect Mabel NEG    Prepare RBC 1 Unit   Result Value Ref Range    UNIT NUMBER [id number]     Product Code B9301E43     DISPENSE STATUS TRANSFUSED     CODING SYSTEM WRQA016     Unit Blood Type Code 6200     Unit Blood Type A POS     Unit Expiration [id number]         Pending Diagnostic Studies:     Procedure Component Value Units Date/Time    Heterophile antibody titer [[id number]] Collected:  02/10/20 1254    Order Status:  Sent Lab Status:  In process Updated:  02/10/20 2131    Specimen:  Blood     Humoral Immune Eval (Pneumo Serotypes) With H. Flu [[id number]] Collected:  02/10/20 0517    Order  Status:  Sent Lab Status:  In process Updated:  02/10/20 1011    Specimen:  Blood          Medications:  Reconciled Home Medications:      Medication List      START taking these medications    amoxicillin-clavulanate 400-57 mg/5 mL Susr  Commonly known as:  AUGMENTIN  Take 10.9 mLs (872 mg total) by mouth every 12 (twelve) hours. for 6 days then discard remainder     ferrous sulfate 325 (65 FE) MG EC tablet  Take 1 tablet (325 mg total) by mouth 2 (two) times daily.        CONTINUE taking these medications    Xulane 150-35 mcg/24 hr  Generic drug:  norelgestromin-ethinyl estradiol  Place 1 patch onto the skin once a week.            Indwelling Lines/Drains at time of discharge:   Lines/Drains/Airways     None                 Time spent on the discharge of patient: 40 minutes  Patient was seen and examined on the date of discharge and determined to be suitable for discharge.         eBrnard Fink MD  Department of Hospital Medicine  [facility]

## 2020-02-14 LAB
BACTERIA BLD CULT: NORMAL
BACTERIA BLD CULT: NORMAL
C DIPHTHERIAE AB SER IA-ACNC: 0.3 IU/ML
C TETANI AB SER-ACNC: 0.92 IU/ML
DEPRECATED S PNEUM 1 IGG SER-MCNC: 2.1 MCG/ML
DEPRECATED S PNEUM12 IGG SER-MCNC: 0.7 MCG/ML
DEPRECATED S PNEUM14 IGG SER-MCNC: <0.3 MCG/ML
DEPRECATED S PNEUM19 IGG SER-MCNC: 1.4 MCG/ML
DEPRECATED S PNEUM23 IGG SER-MCNC: <0.3 MCG/ML
DEPRECATED S PNEUM3 IGG SER-MCNC: <0.3 MCG/ML
DEPRECATED S PNEUM4 IGG SER-MCNC: <0.3 MCG/ML
DEPRECATED S PNEUM5 IGG SER-MCNC: 3.9 MCG/ML
DEPRECATED S PNEUM8 IGG SER-MCNC: 1.2 MCG/ML
DEPRECATED S PNEUM9 IGG SER-MCNC: <0.3 MCG/ML
HAEM INFLU B IGG SER-MCNC: <0.15 MCG/ML
S PNEUM DA 18C IGG SER-MCNC: 0.9 MCG/ML
S PNEUM DA 6B IGG SER-MCNC: <0.3 MCG/ML
S PNEUM DA 7F IGG SER-MCNC: 1 MCG/ML
S PNEUM DA 9V IGG SER-MCNC: <0.3 MCG/ML

## 2020-03-05 ENCOUNTER — OFFICE VISIT (OUTPATIENT)
Dept: INFECTIOUS DISEASES | Facility: CLINIC | Age: 20
End: 2020-03-05
Payer: MEDICAID

## 2020-03-05 VITALS — SYSTOLIC BLOOD PRESSURE: 102 MMHG | TEMPERATURE: 98 F | HEART RATE: 84 BPM | DIASTOLIC BLOOD PRESSURE: 62 MMHG

## 2020-03-05 DIAGNOSIS — N30.00 ACUTE CYSTITIS WITHOUT HEMATURIA: ICD-10-CM

## 2020-03-05 DIAGNOSIS — D50.0 IRON DEFICIENCY ANEMIA DUE TO CHRONIC BLOOD LOSS: ICD-10-CM

## 2020-03-05 DIAGNOSIS — B34.9 ACUTE VIRAL SYNDROME: ICD-10-CM

## 2020-03-05 PROBLEM — N39.0 UTI (URINARY TRACT INFECTION): Status: ACTIVE | Noted: 2020-03-05

## 2020-03-05 PROCEDURE — 99214 PR OFFICE/OUTPT VISIT, EST, LEVL IV, 30-39 MIN: ICD-10-PCS | Mod: S$PBB,,, | Performed by: INTERNAL MEDICINE

## 2020-03-05 PROCEDURE — 99999 PR PBB SHADOW E&M-EST. PATIENT-LVL II: ICD-10-PCS | Mod: PBBFAC,,, | Performed by: INTERNAL MEDICINE

## 2020-03-05 PROCEDURE — 99212 OFFICE O/P EST SF 10 MIN: CPT | Mod: PBBFAC | Performed by: INTERNAL MEDICINE

## 2020-03-05 PROCEDURE — 99214 OFFICE O/P EST MOD 30 MIN: CPT | Mod: S$PBB,,, | Performed by: INTERNAL MEDICINE

## 2020-03-05 PROCEDURE — 99999 PR PBB SHADOW E&M-EST. PATIENT-LVL II: CPT | Mod: PBBFAC,,, | Performed by: INTERNAL MEDICINE

## 2020-03-05 RX ORDER — FLUTICASONE PROPIONATE 50 MCG
SPRAY, SUSPENSION (ML) NASAL
COMMUNITY
Start: 2020-02-17 | End: 2023-06-29

## 2020-03-05 RX ORDER — AMOXICILLIN AND CLAVULANATE POTASSIUM 600; 42.9 MG/5ML; MG/5ML
POWDER, FOR SUSPENSION ORAL
COMMUNITY
Start: 2020-03-01 | End: 2023-06-29

## 2020-03-05 NOTE — PROGRESS NOTES
Subjective:       Patient ID: Danielle Brown is a 19 y.o. female.    Chief Complaint: Follow-up    HPI   19 year old woman who was recently seen in the Hospital and was discharged on 02/12 for sepsis syndrome.  All tests done  -respiratory panel -2/10- PCR.    Immunodeficency panel -negative but showed low H .influenza was low.  She is afebrile.  Review of Systems   Constitutional: Negative for chills and fatigue.   HENT: Positive for postnasal drip. Negative for congestion, ear pain, facial swelling, sinus pressure and sore throat.    Eyes: Negative for pain.   Respiratory: Positive for cough. Negative for apnea, chest tightness, shortness of breath and stridor.    Cardiovascular: Negative for chest pain, palpitations and leg swelling.   Gastrointestinal: Negative for abdominal distention, abdominal pain, diarrhea and nausea.   Endocrine: Negative for polydipsia and polyphagia.   Genitourinary: Negative for decreased urine volume, difficulty urinating, frequency and genital sores.   Musculoskeletal: Negative for arthralgias and gait problem.   Neurological: Negative for light-headedness and headaches.   Hematological: Negative for adenopathy.   Psychiatric/Behavioral: Negative for agitation, confusion and decreased concentration.       Objective:      Physical Exam   Constitutional: She is oriented to person, place, and time. She appears well-developed and well-nourished.   HENT:   Head: Normocephalic and atraumatic.   Eyes: Pupils are equal, round, and reactive to light. EOM are normal.   Neck: Normal range of motion. Neck supple. No tracheal deviation present. No thyromegaly present.   Cardiovascular: Normal rate and regular rhythm.   Pulmonary/Chest: No respiratory distress. She has no wheezes. She has no rales.   Abdominal: Soft. Bowel sounds are normal. She exhibits no distension. There is no tenderness.   Neurological: She is alert and oriented to person, place, and time. She has normal reflexes. No cranial  nerve deficit. Coordination normal.   Skin: Skin is warm and dry. No pallor.   Psychiatric: She has a normal mood and affect. Judgment and thought content normal.   Nursing note and vitals reviewed.      Assessment:         1. Acute viral syndrome     2. Acute cystitis without hematuria     3. Iron deficiency anemia due to chronic blood loss         Plan:       Problem List Items Addressed This Visit     Acute viral syndrome     Improved at this time.  She still reports intermittent cough . Denies high grade fever .  Resp viral panel was negative.         Iron deficiency anemia     Will continue iron supplementation          UTI (urinary tract infection)     On Augumentin , will follow final cultures to guide therapy .           Advised to use stress reduction strategies.  Advised to get mychart

## 2020-03-05 NOTE — ASSESSMENT & PLAN NOTE
Improved at this time.  She still reports intermittent cough . Denies high grade fever .  Resp viral panel was negative.

## 2023-05-22 ENCOUNTER — HOSPITAL ENCOUNTER (EMERGENCY)
Facility: HOSPITAL | Age: 23
Discharge: HOME | End: 2023-05-22
Attending: EMERGENCY MEDICINE
Payer: MEDICAID

## 2023-05-22 VITALS
TEMPERATURE: 98 F | SYSTOLIC BLOOD PRESSURE: 111 MMHG | OXYGEN SATURATION: 100 % | BODY MASS INDEX: 21.71 KG/M2 | HEART RATE: 88 BPM | WEIGHT: 118 LBS | RESPIRATION RATE: 17 BRPM | DIASTOLIC BLOOD PRESSURE: 57 MMHG | HEIGHT: 62 IN

## 2023-05-22 DIAGNOSIS — S39.012A STRAIN OF LUMBAR REGION, INITIAL ENCOUNTER: Primary | ICD-10-CM

## 2023-05-22 LAB
B-HCG UR QL: NEGATIVE
BILIRUB UR QL STRIP: NEGATIVE
CLARITY UR: CLEAR
COLOR UR: YELLOW
CTP QC/QA: YES
GLUCOSE UR QL STRIP: NEGATIVE
HGB UR QL STRIP: NEGATIVE
KETONES UR QL STRIP: ABNORMAL
LEUKOCYTE ESTERASE UR QL STRIP: NEGATIVE
NITRITE UR QL STRIP: NEGATIVE
PH UR STRIP: 6 [PH] (ref 5–8)
PROT UR QL STRIP: ABNORMAL
SP GR UR STRIP: >1.03 (ref 1–1.03)
URN SPEC COLLECT METH UR: ABNORMAL
UROBILINOGEN UR STRIP-ACNC: NEGATIVE EU/DL

## 2023-05-22 PROCEDURE — 81025 URINE PREGNANCY TEST: CPT | Performed by: NURSE PRACTITIONER

## 2023-05-22 PROCEDURE — 81003 URINALYSIS AUTO W/O SCOPE: CPT | Performed by: NURSE PRACTITIONER

## 2023-05-22 PROCEDURE — 99284 EMERGENCY DEPT VISIT MOD MDM: CPT

## 2023-05-22 PROCEDURE — 63600175 PHARM REV CODE 636 W HCPCS: Performed by: PHYSICIAN ASSISTANT

## 2023-05-22 PROCEDURE — 96372 THER/PROPH/DIAG INJ SC/IM: CPT | Performed by: PHYSICIAN ASSISTANT

## 2023-05-22 RX ORDER — ONDANSETRON 4 MG/1
4 TABLET, ORALLY DISINTEGRATING ORAL EVERY 6 HOURS PRN
Qty: 15 TABLET | Refills: 0 | Status: SHIPPED | OUTPATIENT
Start: 2023-05-22 | End: 2023-05-27

## 2023-05-22 RX ORDER — CLINDAMYCIN PHOSPHATE 10 MG/G
AEROSOL, FOAM TOPICAL
COMMUNITY
End: 2023-06-29

## 2023-05-22 RX ORDER — DEXTROAMPHETAMINE SACCHARATE, AMPHETAMINE ASPARTATE, DEXTROAMPHETAMINE SULFATE AND AMPHETAMINE SULFATE 5; 5; 5; 5 MG/1; MG/1; MG/1; MG/1
TABLET ORAL
COMMUNITY

## 2023-05-22 RX ORDER — TRETINOIN 0.1 MG/G
GEL TOPICAL
COMMUNITY
End: 2023-06-29

## 2023-05-22 RX ORDER — KETOROLAC TROMETHAMINE 30 MG/ML
30 INJECTION, SOLUTION INTRAMUSCULAR; INTRAVENOUS
Status: COMPLETED | OUTPATIENT
Start: 2023-05-22 | End: 2023-05-22

## 2023-05-22 RX ORDER — FAMOTIDINE 20 MG/1
20 TABLET, FILM COATED ORAL 2 TIMES DAILY
Qty: 30 TABLET | Refills: 0 | Status: SHIPPED | OUTPATIENT
Start: 2023-05-22 | End: 2023-06-29

## 2023-05-22 RX ORDER — IBUPROFEN 600 MG/1
600 TABLET ORAL EVERY 6 HOURS PRN
Qty: 20 TABLET | Refills: 0 | Status: SHIPPED | OUTPATIENT
Start: 2023-05-22 | End: 2023-05-27

## 2023-05-22 RX ORDER — LIDOCAINE 50 MG/G
1 PATCH TOPICAL DAILY
Qty: 15 PATCH | Refills: 0 | Status: SHIPPED | OUTPATIENT
Start: 2023-05-22 | End: 2023-06-06

## 2023-05-22 RX ORDER — ACETAMINOPHEN 500 MG
500 TABLET ORAL EVERY 4 HOURS PRN
Qty: 20 TABLET | Refills: 0 | Status: SHIPPED | OUTPATIENT
Start: 2023-05-22 | End: 2023-05-27

## 2023-05-22 RX ORDER — METHOCARBAMOL 500 MG/1
1000 TABLET, FILM COATED ORAL 3 TIMES DAILY PRN
Qty: 30 TABLET | Refills: 0 | Status: SHIPPED | OUTPATIENT
Start: 2023-05-22 | End: 2023-06-29

## 2023-05-22 RX ORDER — DEXAMETHASONE SODIUM PHOSPHATE 4 MG/ML
6 INJECTION, SOLUTION INTRA-ARTICULAR; INTRALESIONAL; INTRAMUSCULAR; INTRAVENOUS; SOFT TISSUE
Status: COMPLETED | OUTPATIENT
Start: 2023-05-22 | End: 2023-05-22

## 2023-05-22 RX ADMIN — KETOROLAC TROMETHAMINE 30 MG: 30 INJECTION, SOLUTION INTRAMUSCULAR; INTRAVENOUS at 09:05

## 2023-05-22 RX ADMIN — DEXAMETHASONE SODIUM PHOSPHATE 6 MG: 4 INJECTION INTRA-ARTICULAR; INTRALESIONAL; INTRAMUSCULAR; INTRAVENOUS; SOFT TISSUE at 09:05

## 2023-05-23 NOTE — ED PROVIDER NOTES
"Encounter Date: 5/22/2023       History     Chief Complaint   Patient presents with    Back Pain     Pt c/o right lower back pain & cloudy urine x few days; pt reports pain started intermittently last week but has been constant x couple days; pt states she has seen orthopedic doctor for back pain in the past; denies dysuria but states "I think I might have a UTI"     CC:  Back pain    HPI:   23-year-old female presenting for evaluation of 1 week history of intermittent R lumbar back pain 8/10 in severity.  She reports it is worse after showering and with lying on her back and worse in the evenings. Treated with IM toradol steroid and PRP shots by Dr. Perea last year. She is not followed by ortho/spine. No radiation of pain weakness, paresthesias, bowel or bladder incontinence, fever, chills, abdominal pain, saddle anesthesia.  Attempted treatment with tylenol.  She additionally reports intermittent lower abdominal cramping.  Denies dysuria hematuria, urgency or frequency.  Reports she thinks she may have UTI.    The history is provided by the patient.   Review of patient's allergies indicates:   Allergen Reactions    Sulfamethoxazole-trimethoprim Swelling     To lips  Other reaction(s): lip swelling      Sulfa (sulfonamide antibiotics)      Past Medical History:   Diagnosis Date    Sepsis      No past surgical history on file.  Family History   Problem Relation Age of Onset    Spinal muscular atrophy Brother      Social History     Tobacco Use    Smoking status: Never    Smokeless tobacco: Never   Substance Use Topics    Alcohol use: Never    Drug use: Never     Review of Systems   Constitutional:  Negative for chills and fever.   HENT:  Negative for congestion, ear pain, nosebleeds, rhinorrhea, sore throat and trouble swallowing.    Eyes:  Negative for redness.   Respiratory:  Negative for cough, shortness of breath and stridor.    Cardiovascular:  Negative for chest pain.   Gastrointestinal:  Negative for " abdominal pain, constipation, diarrhea, nausea and vomiting.   Genitourinary:  Negative for decreased urine volume, dysuria, frequency, hematuria and urgency.   Musculoskeletal:  Positive for back pain. Negative for neck pain.   Skin:  Negative for rash and wound.   Neurological:  Negative for dizziness, speech difficulty, weakness, light-headedness, numbness and headaches.   Hematological:  Does not bruise/bleed easily.   Psychiatric/Behavioral:  Negative for confusion.      Physical Exam     Initial Vitals [05/22/23 2017]   BP Pulse Resp Temp SpO2   (!) 111/57 88 17 98.2 °F (36.8 °C) 100 %      MAP       --         Physical Exam    Nursing note and vitals reviewed.  Constitutional: She appears well-developed and well-nourished.   HENT:   Head: Normocephalic.   Right Ear: External ear normal.   Left Ear: External ear normal.   Nose: Nose normal.   Mouth/Throat: Oropharynx is clear and moist.   Eyes: Conjunctivae are normal.   Cardiovascular:  Normal rate and regular rhythm.     Exam reveals no gallop and no friction rub.       No murmur heard.  Pulses:       Dorsalis pedis pulses are 2+ on the right side and 2+ on the left side.   Pulmonary/Chest: Breath sounds normal. She has no wheezes. She has no rhonchi. She has no rales.   Abdominal: Abdomen is soft. Bowel sounds are normal. She exhibits no distension. There is no abdominal tenderness. There is no rebound, no guarding, no tenderness at McBurney's point and negative Chiu's sign.   Musculoskeletal:         General: Normal range of motion.      Comments: Tenderness palpation over the right lumbar paraspinous musculature.  No midline tenderness.  Normal strength.  Ambulatory     Lymphadenopathy:     She has no cervical adenopathy.   Neurological: She is alert. She has normal strength. No cranial nerve deficit or sensory deficit.   Skin: Skin is warm and dry.   Psychiatric: She has a normal mood and affect.       ED Course   Procedures  Labs Reviewed    URINALYSIS, REFLEX TO URINE CULTURE - Abnormal; Notable for the following components:       Result Value    Specific Gravity, UA >1.030 (*)     Protein, UA Trace (*)     Ketones, UA Trace (*)     All other components within normal limits    Narrative:     Specimen Source->Urine   POCT URINE PREGNANCY          Imaging Results    None          Medications   ketorolac injection 30 mg (30 mg Intramuscular Given 5/22/23 2121)   dexAMETHasone injection 6 mg (6 mg Intramuscular Given 5/22/23 2122)     Medical Decision Making:   Clinical Tests:   Lab Tests: Ordered and Reviewed  The following lab test(s) were unremarkable: UPT and Urinalysis  ED Management:  23 year old patient presenting 2/2 back pain most consistent with musculoskeletal strain  Patient was given IM toradol and decadron for pain and discharged with meds for symptomatic tx. Spine referral.     Low suspicion for acute cord compression or cauda equina at this time, given presentation and symptoms, including epidural abscess or hematoma. Patient has no history of malignancy, active or distant history.  Patient has no unexplained weight loss. No recent fevers, rigors, malaise, or recent infection.  No history of IVDU or skin-popping.  Patient does not have any history concerning for saddle anesthesia/perianal sensory loss or complaining of decreased rectal tone. Patient does not have urinary retention or inability to control urine from overflow.  Patient has no tenderness overlying spinous process. Patient has no focal weakness on examination.  Given exam and history, low suspicion for cord compression, cauda equina, epidural abscess/hematoma. Distally neurovascuarly intact. Query likely musculoskeletal component versus sciatica. Discussed pain control, physical therapy and follow up with PMD. Cautious return precautions discussed w/ full understanding  Taisha Sue                              Clinical Impression:   Final diagnoses:  [S39.012A]  Strain of lumbar region, initial encounter (Primary)               Taisha Sue PA-C  05/22/23 2122

## 2023-05-23 NOTE — FIRST PROVIDER EVALUATION
"Medical screening examination initiated.  I have conducted a focused provider triage encounter, findings are as follows:    Brief history of present illness:  Lower back pain and cloudy urine for several days; thinks may have a UTI    Vitals:    05/22/23 2017   BP: (!) 111/57   BP Location: Right arm   Patient Position: Sitting   Pulse: 88   Resp: 17   Temp: 98.2 °F (36.8 °C)   TempSrc: Oral   SpO2: 100%   Weight: 53.5 kg (118 lb)   Height: 5' 2" (1.575 m)       Pertinent physical exam:  NAD    Brief workup plan:  UA, UPT    Preliminary workup initiated; this workup will be continued and followed by the physician or advanced practice provider that is assigned to the patient when roomed.  "

## 2023-05-23 NOTE — ED NOTES
Bed: 36qTrk  Expected date:   Expected time:   Means of arrival: Personal Transportation  Comments:

## 2023-05-23 NOTE — DISCHARGE INSTRUCTIONS

## 2023-10-31 ENCOUNTER — HOSPITAL ENCOUNTER (EMERGENCY)
Facility: HOSPITAL | Age: 23
Discharge: HOME | End: 2023-10-31
Attending: EMERGENCY MEDICINE
Payer: MEDICAID

## 2023-10-31 VITALS
DIASTOLIC BLOOD PRESSURE: 72 MMHG | TEMPERATURE: 98 F | RESPIRATION RATE: 18 BRPM | SYSTOLIC BLOOD PRESSURE: 124 MMHG | OXYGEN SATURATION: 100 % | WEIGHT: 120 LBS | HEIGHT: 63 IN | BODY MASS INDEX: 21.26 KG/M2 | HEART RATE: 74 BPM

## 2023-10-31 DIAGNOSIS — H10.501 BLEPHAROCONJUNCTIVITIS OF RIGHT EYE, UNSPECIFIED BLEPHAROCONJUNCTIVITIS TYPE: Primary | ICD-10-CM

## 2023-10-31 DIAGNOSIS — H18.821 CONTACT LENS OVERWEAR OF RIGHT EYE: ICD-10-CM

## 2023-10-31 PROCEDURE — 25000003 PHARM REV CODE 250

## 2023-10-31 PROCEDURE — 99284 EMERGENCY DEPT VISIT MOD MDM: CPT

## 2023-10-31 RX ORDER — NAPROXEN 500 MG/1
500 TABLET ORAL 2 TIMES DAILY PRN
Qty: 30 TABLET | Refills: 0 | Status: SHIPPED | OUTPATIENT
Start: 2023-10-31

## 2023-10-31 RX ORDER — LEVOFLOXACIN 5 MG/ML
1 SOLUTION OPHTHALMIC
Qty: 10 ML | Refills: 0 | Status: SHIPPED | OUTPATIENT
Start: 2023-10-31 | End: 2023-11-07

## 2023-10-31 RX ORDER — PROPARACAINE HYDROCHLORIDE 5 MG/ML
2 SOLUTION/ DROPS OPHTHALMIC
Status: COMPLETED | OUTPATIENT
Start: 2023-10-31 | End: 2023-10-31

## 2023-10-31 RX ORDER — LEVOFLOXACIN 5 MG/ML
1 SOLUTION OPHTHALMIC
Qty: 10 ML | Refills: 0 | Status: SHIPPED | OUTPATIENT
Start: 2023-10-31 | End: 2023-10-31 | Stop reason: SDUPTHER

## 2023-10-31 RX ORDER — NAPROXEN 500 MG/1
500 TABLET ORAL 2 TIMES DAILY PRN
Qty: 30 TABLET | Refills: 0 | Status: SHIPPED | OUTPATIENT
Start: 2023-10-31 | End: 2023-10-31 | Stop reason: SDUPTHER

## 2023-10-31 RX ADMIN — FLUORESCEIN SODIUM 1 EACH: 1 STRIP OPHTHALMIC at 11:10

## 2023-10-31 RX ADMIN — PROPARACAINE HYDROCHLORIDE 2 DROP: 5 SOLUTION/ DROPS OPHTHALMIC at 11:10

## 2023-10-31 NOTE — ED PROVIDER NOTES
Encounter Date: 10/31/2023       History     Chief Complaint   Patient presents with    Eye Pain     Pt went to sleep with her contacts. C/o pain and redness to right eye. Seen at [facility] and dx with a corneal abrasion. Sent to ED for further evaluation     23 year old female with no past medical history presents to the ED with pain to R eye after sleeping in her contact lenses. Patient stated that she woke up to pain in R eye and unable to open/close without pain.  Patient describes the pain as irritation and rates it a 7/10.  She visited urgent care where she was diagnosed with a corneal abrasion and sent to ED for further evaluation. Patient hasn't taken anything to make this better. Endorse watery discharge. Denies visual changes,fever, chills, nausea, vomiting, URI symptoms, pus discharge, headache, or recent trauma.  Patient states she was eye doctor appointment tomorrow.      Review of patient's allergies indicates:   Allergen Reactions    Sulfamethoxazole-trimethoprim Swelling     To lips  Other reaction(s): lip swelling      Sulfa (sulfonamide antibiotics)      Past Medical History:   Diagnosis Date    Anemia, unspecified     Sepsis     Vitamin D deficiency      No past surgical history on file.  Family History   Problem Relation Age of Onset    Hypertension Mother     Diabetes Father     Spinal muscular atrophy Brother      Social History     Tobacco Use    Smoking status: Never    Smokeless tobacco: Never   Substance Use Topics    Alcohol use: Never    Drug use: Never     Review of Systems   Constitutional:  Negative for chills, diaphoresis and fever.   HENT:  Negative for sore throat.    Eyes:  Positive for pain (right eye irritation), discharge (watery right eye) and redness (right). Negative for itching and visual disturbance.   Respiratory:  Negative for shortness of breath.    Cardiovascular:  Negative for chest pain.   Gastrointestinal:  Negative for nausea and vomiting.   Neurological:  Negative for  dizziness, weakness, light-headedness and headaches.   Hematological:  Does not bruise/bleed easily.       Physical Exam     Initial Vitals [10/31/23 1045]   BP Pulse Resp Temp SpO2   118/66 79 18 97.9 °F (36.6 °C) 100 %      MAP       --         Physical Exam    Nursing note and vitals reviewed.  Constitutional: Vital signs are normal. She appears well-developed and well-nourished. She is not diaphoretic. She is active. She does not appear ill. No distress.   HENT:   Head: Normocephalic and atraumatic.   Right Ear: External ear normal.   Left Ear: External ear normal.   Nose: Nose normal.   Eyes: EOM and lids are normal. Pupils are equal, round, and reactive to light. Right eye exhibits no chemosis, no discharge and no exudate. No foreign body present in the right eye. Left eye exhibits no discharge. Right conjunctiva is injected. Right conjunctiva has no hemorrhage. Right eye exhibits normal extraocular motion. Left eye exhibits normal extraocular motion.   Slit lamp exam:       The right eye shows no corneal abrasion, no corneal flare, no corneal ulcer, no foreign body, no hyphema and no hypopyon.        The left eye shows no fluorescein uptake and no anterior chamber bulge.   Normal EOMs. PERRL. IOP right eye -19,20. Injected conjunctivae. No FB appreciated. No fluorescein uptake.  Proparacaine provided relief.   Neck: Phonation normal. Neck supple.   Normal range of motion.  Cardiovascular:  Normal rate and regular rhythm.           Pulmonary/Chest: Effort normal and breath sounds normal. No respiratory distress.   Abdominal: She exhibits no distension.   Musculoskeletal:         General: Normal range of motion.      Cervical back: Normal range of motion and neck supple.     Neurological: She is alert and oriented to person, place, and time. She has normal strength. No sensory deficit. GCS eye subscore is 4. GCS verbal subscore is 5. GCS motor subscore is 6.   Skin: Skin is warm and dry. Capillary refill takes  less than 2 seconds.         ED Course   Procedures  Labs Reviewed   POCT URINE PREGNANCY          Imaging Results    None          Medications   proparacaine 0.5 % ophthalmic solution 2 drop (2 drops Left Eye Given by Provider 10/31/23 1156)   fluorescein ophthalmic strip 1 each (1 each Left Eye Given 10/31/23 1156)     Medical Decision Making  23 year old female with no past medical history presents to the ED with pain to R eye after sleeping in her contact lenses.  Patient's chart and medical history reviewed.    Ddx:  Corneal abrasion  Corneal ulcer  Conjunctivitis  Stye  Hordeum    Glaucoma  Cataracts   Anterior Uveitis     Patient's vitals reviewed.  Afebrile, no respiratory distress, and nontoxic-appearing in the ED. 23 year old female with no past medical history presents to the ED with pain to R eye after sleeping in her contact lenses.  Patient denied pain medication.  Normal visual acuity.  Patient will be sent home with levofloxacin eyedrops to cover for Pseudomonas activity due to contact lens wear.  Patient will also be sent home on naproxen as needed for pain.  Instructed patient to do warm compresses and avoid rubbing the eye, she verbalized understanding.  I diluted proparacaine 10-1 with normal saline for patient to use every 4 hours as needed for pain.  Patient will follow-up with her eye doctor tomorrow.Patient agrees with this plan. Discussed with her strict return precautions, she verbalized understanding. Patient is stable for discharge.       Amount and/or Complexity of Data Reviewed  Labs: ordered.    Risk  Prescription drug management.                               Clinical Impression:   Final diagnoses:  [H10.501] Blepharoconjunctivitis of right eye, unspecified blepharoconjunctivitis type (Primary)  [H18.821] Contact lens overwear of right eye        ED Disposition Condition    Discharge Stable          ED Prescriptions       Medication Sig Dispense Start Date End Date Auth. Provider     levoFLOXacin (QUIXIN) 0.5 % ophthalmic solution  (Status: Discontinued) Place 1 drop into the right eye every 2 (two) hours. for 7 days 10 mL 10/31/2023 10/31/2023 Holdsworth, Alayna, PA-C    naproxen (NAPROSYN) 500 MG tablet  (Status: Discontinued) Take 1 tablet (500 mg total) by mouth 2 (two) times daily as needed (Pain). 30 tablet 10/31/2023 10/31/2023 Holdsworth, Alayna, PA-C    levoFLOXacin (QUIXIN) 0.5 % ophthalmic solution Place 1 drop into the right eye every 2 (two) hours. for 7 days 10 mL 10/31/2023 11/7/2023 Holdsworth, Alayna, PA-C    naproxen (NAPROSYN) 500 MG tablet Take 1 tablet (500 mg total) by mouth 2 (two) times daily as needed (Pain). 30 tablet 10/31/2023 -- Holdsworth, Alayna, PA-C          Follow-up Information       Follow up With Specialties Details Why Contact Info    Your eye doctor                 Holdsworth, Alayna, PA-C  10/31/23 7908

## 2023-10-31 NOTE — Clinical Note
"Danielle"Vee Brown was seen and treated in our emergency department on 10/31/2023.  She may return to work on 11/01/2023.       If you have any questions or concerns, please don't hesitate to call.      Holdsworth, Alayna, PA-C"

## 2023-10-31 NOTE — ED NOTES
Pt fell asleep last pm w/ her daily wear contacts in, awoke this am w/ c/o right eye pain and redness.  Denies known trauma.  Pt is AAOx3, resp even and unlabored, skin warm and dry.  NAD noted.

## 2023-10-31 NOTE — DISCHARGE INSTRUCTIONS

## 2025-05-01 ENCOUNTER — LAB VISIT (OUTPATIENT)
Dept: LAB | Facility: HOSPITAL | Age: 25
End: 2025-05-01
Attending: EMERGENCY MEDICINE

## 2025-05-01 DIAGNOSIS — Z02.1 ENCOUNTER FOR PRE-EMPLOYMENT EXAMINATION: ICD-10-CM

## 2025-05-01 PROCEDURE — 86765 RUBEOLA ANTIBODY: CPT

## 2025-05-01 PROCEDURE — 86735 MUMPS ANTIBODY: CPT

## 2025-05-01 PROCEDURE — 86787 VARICELLA-ZOSTER ANTIBODY: CPT

## 2025-05-01 PROCEDURE — 86762 RUBELLA ANTIBODY: CPT

## 2025-05-01 PROCEDURE — 86480 TB TEST CELL IMMUN MEASURE: CPT

## 2025-05-01 PROCEDURE — 86706 HEP B SURFACE ANTIBODY: CPT

## 2025-05-01 PROCEDURE — 36415 COLL VENOUS BLD VENIPUNCTURE: CPT

## 2025-05-02 LAB
HBV SURFACE AB SER-ACNC: <3 MIU/ML
HBV SURFACE AB SERPL IA-ACNC: NORMAL M[IU]/ML
MUMPS IGG (OHS): 231 AU/ML
MUMPS IGG INTERPRETATION (OHS): POSITIVE
RUBEOLA (MEASLES) IGG (OHS): 63.4 AU/ML
RUBEOLA IGG INTERP. (OHS): POSITIVE
RUBV IGG SER-ACNC: 34 IU/ML
RUBV IGG SER-IMP: REACTIVE
V.ZOSTER IGG INTERP (OHS): POSITIVE
VARICELLA ZOSTER IGG (OHS): 2.49 S/CO

## 2025-05-03 LAB
MITOGEN MINUS NIL (OHS): 9.99
NIL TB SYNCED (OHS): 0.01
QUANTIFERON GOLD INTERP (OHS): NEGATIVE
TB1 AG MINUS NIL (OHS): 0.03
TB2 AG MINUS NIL (OHS): 0.04

## 2025-06-18 ENCOUNTER — PATIENT MESSAGE (OUTPATIENT)
Dept: RESEARCH | Facility: HOSPITAL | Age: 25
End: 2025-06-18
Payer: MEDICAID